# Patient Record
Sex: FEMALE | Race: OTHER | HISPANIC OR LATINO | URBAN - METROPOLITAN AREA
[De-identification: names, ages, dates, MRNs, and addresses within clinical notes are randomized per-mention and may not be internally consistent; named-entity substitution may affect disease eponyms.]

---

## 2017-09-14 ENCOUNTER — EMERGENCY (EMERGENCY)
Facility: HOSPITAL | Age: 82
LOS: 1 days | Discharge: HOME | End: 2017-09-14
Attending: EMERGENCY MEDICINE | Admitting: EMERGENCY MEDICINE
Payer: MEDICARE

## 2017-09-14 VITALS
RESPIRATION RATE: 18 BRPM | HEART RATE: 98 BPM | TEMPERATURE: 100 F | OXYGEN SATURATION: 97 % | SYSTOLIC BLOOD PRESSURE: 199 MMHG | DIASTOLIC BLOOD PRESSURE: 95 MMHG

## 2017-09-14 VITALS
TEMPERATURE: 99 F | DIASTOLIC BLOOD PRESSURE: 72 MMHG | RESPIRATION RATE: 18 BRPM | SYSTOLIC BLOOD PRESSURE: 118 MMHG | HEART RATE: 80 BPM | OXYGEN SATURATION: 97 %

## 2017-09-14 DIAGNOSIS — K94.23 GASTROSTOMY MALFUNCTION: ICD-10-CM

## 2017-09-14 DIAGNOSIS — Z88.0 ALLERGY STATUS TO PENICILLIN: ICD-10-CM

## 2017-09-14 DIAGNOSIS — I10 ESSENTIAL (PRIMARY) HYPERTENSION: ICD-10-CM

## 2017-09-14 PROCEDURE — 74000: CPT

## 2017-09-14 PROCEDURE — 74000: CPT | Mod: 26

## 2017-09-14 PROCEDURE — 43760: CPT

## 2017-09-14 PROCEDURE — 99283 EMERGENCY DEPT VISIT LOW MDM: CPT | Mod: 25

## 2017-09-14 PROCEDURE — 43760 CHANGE GASTROSTOMY TUBE PERCUTANEOUS W/O GUIDE: CPT

## 2017-09-14 RX ORDER — DIATRIZOATE MEGLUMINE 180 MG/ML
30 INJECTION, SOLUTION INTRAVESICAL ONCE
Qty: 0 | Refills: 0 | Status: COMPLETED | OUTPATIENT
Start: 2017-09-14 | End: 2017-09-14

## 2017-09-14 RX ADMIN — DIATRIZOATE MEGLUMINE 30 MILLILITER(S): 180 INJECTION, SOLUTION INTRAVESICAL at 21:40

## 2017-09-14 NOTE — ED PROVIDER NOTE - GASTROINTESTINAL, MLM
Abd soft, NT, ND, NABS. No guarding, rebound, or rigidity. No pulsatile abdominal masses. Unable to flush existing PEG. Balloon already deflated. Existing PEG removed

## 2017-09-14 NOTE — ED PROVIDER NOTE - DIAGNOSTIC INTERPRETATION
ER Physician: Maria Eugenia Sunshine DO  ABDOMINAL XRAY INTERPRETATION:  nonspecific gas pattern, no free air noted, no apparent hepatomegaly. PEG in place

## 2017-09-14 NOTE — ED PROVIDER NOTE - MEDICAL DECISION MAKING DETAILS
Pt sent in for PEG replacement. Replace PEG and confirm w/ fistulogram. Anticipate d/c once placed, back to NH

## 2017-09-14 NOTE — ED ADULT NURSE NOTE - PMH
Aphasia    Dementia    GERD (gastroesophageal reflux disease)    HF (heart failure)    HTN (hypertension)

## 2017-09-14 NOTE — ED ADULT NURSE NOTE - OBJECTIVE STATEMENT
80 y/o female with hx of HF, GERD, HTN, Dementia, aphasia was BIBA after peg tube was accidentally removed while in [facility] today. Upon assessment, pt is non verbal, no visible s/s of pain present, abdomen soft, lung fields WNL, breathing is equal and unlabored, pulses palpable, open wound noted to abdomen with no active bleeding, skin warm to touch. Care in progress. Awaiting disposition 82 y/o female with hx of HF, GERD, HTN, Dementia, aphasia was BIBA after peg tube was accidentally removed while in [facility] today. Upon assessment, pt is non verbal, no visible s/s of pain present, abdomen soft, lung fields WNL, breathing is equal and unlabored, pulses palpable, open wound noted to abdomen with peg tube present, skin warm to touch. Care in progress. Awaiting disposition

## 2017-09-14 NOTE — ED PROVIDER NOTE - OBJECTIVE STATEMENT
Pt w/ PMHx OP, aphasia, GERD, CHF, OA, HTN, CVA w/ hemiparesis, dementia    DNR / DNI Pt w/ PMHx OP, aphasia, GERD, CHF, OA, HTN, CVA w/ hemiparesis, dementia sent into the ED for PEG replacement. Hx obtained from NH documentation. Baseline mental status prevents pt from being able to contribute to hx

## 2019-05-24 ENCOUNTER — EMERGENCY (EMERGENCY)
Facility: HOSPITAL | Age: 84
LOS: 1 days | Discharge: HOME | End: 2019-05-24
Attending: EMERGENCY MEDICINE | Admitting: EMERGENCY MEDICINE
Payer: MEDICAID

## 2019-05-24 VITALS
OXYGEN SATURATION: 97 % | RESPIRATION RATE: 16 BRPM | DIASTOLIC BLOOD PRESSURE: 76 MMHG | TEMPERATURE: 98 F | SYSTOLIC BLOOD PRESSURE: 120 MMHG | HEART RATE: 80 BPM

## 2019-05-24 VITALS
HEART RATE: 73 BPM | RESPIRATION RATE: 16 BRPM | OXYGEN SATURATION: 98 % | TEMPERATURE: 98 F | DIASTOLIC BLOOD PRESSURE: 84 MMHG | SYSTOLIC BLOOD PRESSURE: 139 MMHG

## 2019-05-24 DIAGNOSIS — K94.20 GASTROSTOMY COMPLICATION, UNSPECIFIED: ICD-10-CM

## 2019-05-24 DIAGNOSIS — Z88.0 ALLERGY STATUS TO PENICILLIN: ICD-10-CM

## 2019-05-24 PROCEDURE — 74019 RADEX ABDOMEN 2 VIEWS: CPT

## 2019-05-24 PROCEDURE — 74019 RADEX ABDOMEN 2 VIEWS: CPT | Mod: 26

## 2019-05-24 PROCEDURE — 43762 RPLC GTUBE NO REVJ TRC: CPT

## 2019-05-24 PROCEDURE — 99283 EMERGENCY DEPT VISIT LOW MDM: CPT | Mod: 25

## 2019-05-24 NOTE — ED ADULT TRIAGE NOTE - OTHER COMPLAINTS
From [facility] rehab. PT responsive to verbal and tactile stimuli. Per EMS "normally shes AOx2 said the nurse" DNR.

## 2019-05-24 NOTE — ED ADULT NURSE NOTE - OBJECTIVE STATEMENT
Pt. BIBA from NH for g-tube replacement. Pt. w/ 12F g tube taped to L abd, g tube site is erythematic with dark brown drainage, covered with gauze and tape. Pt. is nonverbal, unable to assess orientation, opens and tracks eyes in response to verbal stimuli, moans in response to tactile stimuli. Pt. wearing diaper from NH. Pt. BIBA from NH for g-tube replacement. Pt. w/ 12F g tube taped to L abd, g tube site is erythematic with dark brown drainage, covered with gauze and tape. Pt. is nonverbal, unable to assess orientation, opens and tracks eyes in response to verbal stimuli, moans in response to tactile stimuli. Pt. wearing soiled diaper from NH, diaper and linens changed in ED. Skin intact.

## 2019-05-24 NOTE — ED PROVIDER NOTE - OBJECTIVE STATEMENT
Pt w/ PMHx HTN, dementia, CHF, CVA w/ hemiplegia, cognitive / speech impairments, not alert at baseline Pt w/ PMHx HTN, dementia, CHF, CVA w/ hemiplegia, cognitive / speech impairments, not alert at baseline, sent in from [facility] rehab for PEG tube replacement. Pt unable to contribute to hx 2/2 baseline mental status. On pt's abdomen is taped a 12 Indonesian feeding tube.

## 2019-05-24 NOTE — ED ADULT NURSE NOTE - OTHER COMPLAINTS
From [facility] rehab. PT responsive to verbal and tactile stimuli. Per EMS "normally shes AOx2 said the nurse" DNR.

## 2019-05-24 NOTE — ED PROVIDER NOTE - NSFOLLOWUPINSTRUCTIONS_ED_ALL_ED_FT
You were sent to the ED after your PEG was displaced. We do not have PEG tubes as small as your gastrostomy would allow. You had a 12 Burmese Gastrostomy tube. Our smallest is 20 Burmese. A 12 Burmese Kulkarni catheter was placed, and placement confirmed by XR, and flushes well. You are being sent back to the nursing home with the Kulkarni in place, to arrange definitive treatment as an outpatient, after the holiday weekend. Dr Griffin aware of management and agrees with the plan.    PEG Tube Home Guide  Image   A percutaneous endoscopic gastrostomy (PEG) tube is used to deliver food and fluids directly into the stomach. The tube has a clamp, a cap, and two anchors (bolsters). One bolster keeps the tube from coming out of the stomach. The other bolster holds the tube against the abdomen.    You will be taught how to use and adjust your PEG tube before you leave the hospital. You will also be taught how to care for the opening (stoma) in your abdomen. Make sure that you understand:  How to care for your PEG tube.  How to care for your stoma.  How to give yourself feedings and medicines.  When to call your health care provider for help.  Supplies needed:  Soapy water  Clean, plain water  Clean washcloth  Gauze pad  Syringe  How to care for a PEG tube  Check your PEG tube every day. Make sure:  It is not too tight. The bolster should rest gently over the stoma.  It is in the correct position. There is a judy on the tube that shows when it is in the correct position. Adjust the tube if you need to.  Cleaning your stoma     ImageClean your stoma every day. Follow these steps:  Wash your hands with soap and water. If soap and water are not available, use hand .    Check the skin around the stoma for redness, rash, swelling, drainage, or extra tissue growth. If you notice any of these, call your health care provider.    Wash the stoma and the skin around it using a clean, soft washcloth. Clean using a circular motion, and wipe away from the stoma opening, not toward it.  Use warm, soapy water, and only use cleansers recommended by your health care provider.  Rinse the stoma area with plain water.  Pat the stoma area dry.  You may place a gauze pad over the opening between the outer bolster and your stoma.    Giving a feeding     Your health care provider will give you instructions about:  How much nutrition and fluid you will need for each feeding.  How often to have a feeding.  Whether to take medicine in the tube by itself or with a feeding.  To give yourself a feeding, follow these steps:  Lay out all of the equipment that you will need.    Make sure that the nutritional formula is at room temperature.    Wash your hands with soap and water.    Position yourself so that you are upright. You will need to stay upright throughout the feeding and for at least 30 minutes after the feeding.    Make sure the syringe plunger is pushed in. Place the tip of the syringe in clean water, and slowly pull the plunger to bring (draw up) the water into the syringe.    Remove the clamp and the cap from the PEG tube.    Push the water out of the syringe to clean (flush) the tube.    If the tube is clear, draw up the formula into the syringe. Make sure to use the right amount for each feeding and add water if necessary.    Slowly push the formula from the syringe through the tube.    After the feeding, flush the tube with water.    Put the clamp and the cap on the tube.    Giving medicine    To give yourself medicine, follow these steps:  Lay out all of the equipment that you will need.    If your medicine is in tablet form, crush the tablet and dissolve it in water.    Wash your hands with soap and water.    Position yourself so that you are upright. You will need to stay upright while you give yourself medicine and for at least 30 minutes afterward.    Make sure the syringe plunger is pushed in. Place the tip of the syringe in clean water, and slowly pull the plunger to bring (draw up) the water into the syringe.    Remove the clamp and the cap from the PEG tube.    Push the water out of the syringe to clean (flush) the tube.    If the tube is clear, draw up the medicine into the syringe.    Slowly push the medicine from the syringe through the tube.    Flush the tube with water.    Put the clamp and the cap on the tube.    Do not take sustained release (SR) medicines through your tube. If you are unsure if your medicine is a SR medicine, ask your health care provider or pharmacist.    Contact a health care provider if you have:  Soreness, redness, or irritation around your stoma.  Abdominal pain or bloating during or after your feedings.  Nausea, constipation, or diarrhea that will not go away.  A fever.  Problems with your PEG tube.  Get help right away if:  Your tube is blocked.  Your tube falls out.  You have pain around your stoma.  You are bleeding from your stoma.  Your tube is leaking.  You choke or you have trouble breathing during or after a feeding.  Summary  A percutaneous endoscopic gastrostomy (PEG) tube is used to deliver food and fluids directly into the stomach.  You will be taught how to use and adjust your PEG tube. You will also be taught how to care for the stoma in your abdomen.  Your health care provider will give you instructions on how to give yourself nutritional formula and medicines through your PEG tube.  Contact your health care provider if you have a fever or soreness, redness, or irritation around your stoma.  Get help right away if your tube leaks, is blocked, or falls out. Get help right away if you have pain or bleeding around your stoma.  This information is not intended to replace advice given to you by your health care provider. Make sure you discuss any questions you have with your health care provider.

## 2019-05-24 NOTE — ED PROVIDER NOTE - PHYSICAL EXAMINATION
Constitutional: Awake, nonverbal, and in no apparent distress. chronically ill appearing  ENMT: Airway patent. Normal MM  Eyes: Clear bilaterally  Cardiac: Normal rate, regular rhythm.  Heart sounds S1, S2.  No murmurs, rubs or gallops.  Respiratory: Breaths sounds equal and clear b/l. No increased WOB, tachypnea, hypoxia, or accessory mm use. Pt speaks in full sentences.   Gastrointestinal: Abd soft, NT, ND, NABS. No guarding, rebound, or rigidity. No pulsatile abdominal masses. No organomegaly appreciated. Small gastrostomy w/ granulation tissue, C/D/I. mild skin irritation around the stoma.   Neuro: does not follow commands  Skin: Skin normal color for race, warm, dry and intact.   Psych: unable to assess 2/2 baseline mental status

## 2019-05-24 NOTE — ED PROVIDER NOTE - CLINICAL SUMMARY MEDICAL DECISION MAKING FREE TEXT BOX
PEG tube replacement. Pt has small stoma and small tube previously used. 20 Latvian smallest tube in ED. Attempted to obtain smaller tube from central supply w/o success. Holiday weekend, G-tube not likely to be replaced in hospital. Will place Kulkarni to keep stoma open. Flushes well. Confirmed by XR. Can arrange for definitive tx next week.

## 2019-05-24 NOTE — ED PROVIDER NOTE - DIAGNOSTIC INTERPRETATION
ABDOMINAL XRAY INTERPRETATION:  nonspecific gas pattern, no free air noted, no apparent hepatomegaly. no extravasation of gastrograffin. reviewed w/ radiology resident

## 2019-05-25 PROBLEM — I50.9 HEART FAILURE, UNSPECIFIED: Chronic | Status: ACTIVE | Noted: 2017-09-14

## 2019-05-25 PROBLEM — F03.90 UNSPECIFIED DEMENTIA WITHOUT BEHAVIORAL DISTURBANCE: Chronic | Status: ACTIVE | Noted: 2017-09-14

## 2019-05-25 PROBLEM — R47.01 APHASIA: Chronic | Status: ACTIVE | Noted: 2017-09-14

## 2019-05-25 PROBLEM — I10 ESSENTIAL (PRIMARY) HYPERTENSION: Chronic | Status: ACTIVE | Noted: 2017-09-14

## 2019-05-25 PROBLEM — K21.9 GASTRO-ESOPHAGEAL REFLUX DISEASE WITHOUT ESOPHAGITIS: Chronic | Status: ACTIVE | Noted: 2017-09-14

## 2019-05-25 NOTE — ED PROCEDURE NOTE - PROCEDURE ADDITIONAL DETAILS
Pt had 12 Omani Feeding tube, removed. Very small gastrostomy with some granulation tissue. Smallest G tube in ED 20 Omani. Checked w/ central supply and unable to find smaller tube. 12 Omani Kulkarni catheter placed w/o difficulty. Flushes easily. Confirmed w/ XR

## 2019-05-28 ENCOUNTER — INPATIENT (INPATIENT)
Facility: HOSPITAL | Age: 84
LOS: 2 days | Discharge: NURSING, REHAB OR LONG-TERM CARE | DRG: 393 | End: 2019-05-31
Attending: INTERNAL MEDICINE | Admitting: INTERNAL MEDICINE
Payer: MEDICARE

## 2019-05-28 VITALS
TEMPERATURE: 98 F | SYSTOLIC BLOOD PRESSURE: 113 MMHG | RESPIRATION RATE: 18 BRPM | HEART RATE: 120 BPM | DIASTOLIC BLOOD PRESSURE: 82 MMHG

## 2019-05-28 DIAGNOSIS — D72.829 ELEVATED WHITE BLOOD CELL COUNT, UNSPECIFIED: ICD-10-CM

## 2019-05-28 DIAGNOSIS — I10 ESSENTIAL (PRIMARY) HYPERTENSION: ICD-10-CM

## 2019-05-28 DIAGNOSIS — E87.0 HYPEROSMOLALITY AND HYPERNATREMIA: ICD-10-CM

## 2019-05-28 DIAGNOSIS — R47.01 APHASIA: ICD-10-CM

## 2019-05-28 DIAGNOSIS — F03.90 UNSPECIFIED DEMENTIA WITHOUT BEHAVIORAL DISTURBANCE: ICD-10-CM

## 2019-05-28 DIAGNOSIS — E88.89 OTHER SPECIFIED METABOLIC DISORDERS: ICD-10-CM

## 2019-05-28 DIAGNOSIS — Z43.1 ENCOUNTER FOR ATTENTION TO GASTROSTOMY: ICD-10-CM

## 2019-05-28 DIAGNOSIS — R00.0 TACHYCARDIA, UNSPECIFIED: ICD-10-CM

## 2019-05-28 DIAGNOSIS — A41.9 SEPSIS, UNSPECIFIED ORGANISM: ICD-10-CM

## 2019-05-28 DIAGNOSIS — Z71.89 OTHER SPECIFIED COUNSELING: ICD-10-CM

## 2019-05-28 DIAGNOSIS — Z91.89 OTHER SPECIFIED PERSONAL RISK FACTORS, NOT ELSEWHERE CLASSIFIED: ICD-10-CM

## 2019-05-28 LAB
ALBUMIN SERPL ELPH-MCNC: 3.4 G/DL — SIGNIFICANT CHANGE UP (ref 3.3–5)
ALBUMIN SERPL ELPH-MCNC: 3.4 G/DL — SIGNIFICANT CHANGE UP (ref 3.3–5)
ALP SERPL-CCNC: 172 U/L — HIGH (ref 40–120)
ALP SERPL-CCNC: SIGNIFICANT CHANGE UP U/L (ref 40–120)
ALT FLD-CCNC: 18 U/L — SIGNIFICANT CHANGE UP (ref 10–45)
ALT FLD-CCNC: SIGNIFICANT CHANGE UP U/L (ref 10–45)
ANION GAP SERPL CALC-SCNC: 10 MMOL/L — SIGNIFICANT CHANGE UP (ref 5–17)
ANION GAP SERPL CALC-SCNC: 15 MMOL/L — SIGNIFICANT CHANGE UP (ref 5–17)
APPEARANCE UR: CLEAR — SIGNIFICANT CHANGE UP
APTT BLD: 27.9 SEC — SIGNIFICANT CHANGE UP (ref 27.5–36.3)
AST SERPL-CCNC: 15 U/L — SIGNIFICANT CHANGE UP (ref 10–40)
AST SERPL-CCNC: SIGNIFICANT CHANGE UP U/L (ref 10–40)
BASOPHILS # BLD AUTO: 0.02 K/UL — SIGNIFICANT CHANGE UP (ref 0–0.2)
BASOPHILS NFR BLD AUTO: 0.1 % — SIGNIFICANT CHANGE UP (ref 0–2)
BILIRUB SERPL-MCNC: 0.7 MG/DL — SIGNIFICANT CHANGE UP (ref 0.2–1.2)
BILIRUB SERPL-MCNC: 0.9 MG/DL — SIGNIFICANT CHANGE UP (ref 0.2–1.2)
BILIRUB UR-MCNC: NEGATIVE — SIGNIFICANT CHANGE UP
BUN SERPL-MCNC: 47 MG/DL — HIGH (ref 7–23)
BUN SERPL-MCNC: 51 MG/DL — HIGH (ref 7–23)
CALCIUM SERPL-MCNC: 11.9 MG/DL — HIGH (ref 8.4–10.5)
CALCIUM SERPL-MCNC: 12.7 MG/DL — HIGH (ref 8.4–10.5)
CHLORIDE SERPL-SCNC: 101 MMOL/L — SIGNIFICANT CHANGE UP (ref 96–108)
CHLORIDE SERPL-SCNC: 105 MMOL/L — SIGNIFICANT CHANGE UP (ref 96–108)
CO2 SERPL-SCNC: 30 MMOL/L — SIGNIFICANT CHANGE UP (ref 22–31)
CO2 SERPL-SCNC: 34 MMOL/L — HIGH (ref 22–31)
COLOR SPEC: YELLOW — SIGNIFICANT CHANGE UP
CREAT SERPL-MCNC: 0.8 MG/DL — SIGNIFICANT CHANGE UP (ref 0.5–1.3)
CREAT SERPL-MCNC: 0.91 MG/DL — SIGNIFICANT CHANGE UP (ref 0.5–1.3)
DIFF PNL FLD: ABNORMAL
EOSINOPHIL # BLD AUTO: 0.02 K/UL — SIGNIFICANT CHANGE UP (ref 0–0.5)
EOSINOPHIL NFR BLD AUTO: 0.1 % — SIGNIFICANT CHANGE UP (ref 0–6)
EXTRA SST TUBE: SIGNIFICANT CHANGE UP
GLUCOSE SERPL-MCNC: 116 MG/DL — HIGH (ref 70–99)
GLUCOSE SERPL-MCNC: 125 MG/DL — HIGH (ref 70–99)
GLUCOSE UR QL: NEGATIVE — SIGNIFICANT CHANGE UP
HCT VFR BLD CALC: 45.6 % — HIGH (ref 34.5–45)
HGB BLD-MCNC: 13.1 G/DL — SIGNIFICANT CHANGE UP (ref 11.5–15.5)
IMM GRANULOCYTES NFR BLD AUTO: 0.4 % — SIGNIFICANT CHANGE UP (ref 0–1.5)
INR BLD: 1.25 — HIGH (ref 0.88–1.16)
KETONES UR-MCNC: NEGATIVE — SIGNIFICANT CHANGE UP
LACTATE SERPL-SCNC: 1.7 MMOL/L — SIGNIFICANT CHANGE UP (ref 0.5–2)
LEUKOCYTE ESTERASE UR-ACNC: NEGATIVE — SIGNIFICANT CHANGE UP
LYMPHOCYTES # BLD AUTO: 13.8 % — SIGNIFICANT CHANGE UP (ref 13–44)
LYMPHOCYTES # BLD AUTO: 2 K/UL — SIGNIFICANT CHANGE UP (ref 1–3.3)
MCHC RBC-ENTMCNC: 21.2 PG — LOW (ref 27–34)
MCHC RBC-ENTMCNC: 28.7 GM/DL — LOW (ref 32–36)
MCV RBC AUTO: 73.8 FL — LOW (ref 80–100)
MONOCYTES # BLD AUTO: 0.83 K/UL — SIGNIFICANT CHANGE UP (ref 0–0.9)
MONOCYTES NFR BLD AUTO: 5.7 % — SIGNIFICANT CHANGE UP (ref 2–14)
NEUTROPHILS # BLD AUTO: 11.54 K/UL — HIGH (ref 1.8–7.4)
NEUTROPHILS NFR BLD AUTO: 79.9 % — HIGH (ref 43–77)
NITRITE UR-MCNC: NEGATIVE — SIGNIFICANT CHANGE UP
NRBC # BLD: 0 /100 WBCS — SIGNIFICANT CHANGE UP (ref 0–0)
PH UR: 6 — SIGNIFICANT CHANGE UP (ref 5–8)
PLATELET # BLD AUTO: 389 K/UL — SIGNIFICANT CHANGE UP (ref 150–400)
POTASSIUM SERPL-MCNC: 3.5 MMOL/L — SIGNIFICANT CHANGE UP (ref 3.5–5.3)
POTASSIUM SERPL-MCNC: SIGNIFICANT CHANGE UP MMOL/L (ref 3.5–5.3)
POTASSIUM SERPL-SCNC: 3.5 MMOL/L — SIGNIFICANT CHANGE UP (ref 3.5–5.3)
POTASSIUM SERPL-SCNC: SIGNIFICANT CHANGE UP MMOL/L (ref 3.5–5.3)
PROT SERPL-MCNC: 8.4 G/DL — HIGH (ref 6–8.3)
PROT SERPL-MCNC: 9.6 G/DL — HIGH (ref 6–8.3)
PROT UR-MCNC: 30 MG/DL
PROTHROM AB SERPL-ACNC: 14.2 SEC — HIGH (ref 10–12.9)
RBC # BLD: 6.18 M/UL — HIGH (ref 3.8–5.2)
RBC # FLD: 19.9 % — HIGH (ref 10.3–14.5)
SODIUM SERPL-SCNC: 146 MMOL/L — HIGH (ref 135–145)
SODIUM SERPL-SCNC: 149 MMOL/L — HIGH (ref 135–145)
SP GR SPEC: 1.02 — SIGNIFICANT CHANGE UP (ref 1–1.03)
UROBILINOGEN FLD QL: 0.2 E.U./DL — SIGNIFICANT CHANGE UP
WBC # BLD: 14.47 K/UL — HIGH (ref 3.8–10.5)
WBC # FLD AUTO: 14.47 K/UL — HIGH (ref 3.8–10.5)

## 2019-05-28 PROCEDURE — 74177 CT ABD & PELVIS W/CONTRAST: CPT | Mod: 26

## 2019-05-28 PROCEDURE — 71045 X-RAY EXAM CHEST 1 VIEW: CPT | Mod: 26

## 2019-05-28 PROCEDURE — 93010 ELECTROCARDIOGRAM REPORT: CPT

## 2019-05-28 PROCEDURE — 99285 EMERGENCY DEPT VISIT HI MDM: CPT | Mod: 25

## 2019-05-28 RX ORDER — ACETAMINOPHEN 500 MG
1000 TABLET ORAL ONCE
Refills: 0 | Status: DISCONTINUED | OUTPATIENT
Start: 2019-05-28 | End: 2019-05-30

## 2019-05-28 RX ORDER — BACLOFEN 100 %
1 POWDER (GRAM) MISCELLANEOUS
Qty: 0 | Refills: 0 | DISCHARGE

## 2019-05-28 RX ORDER — ASPIRIN/CALCIUM CARB/MAGNESIUM 324 MG
1 TABLET ORAL
Qty: 0 | Refills: 0 | DISCHARGE

## 2019-05-28 RX ORDER — AMPICILLIN SODIUM AND SULBACTAM SODIUM 250; 125 MG/ML; MG/ML
1.5 INJECTION, POWDER, FOR SUSPENSION INTRAMUSCULAR; INTRAVENOUS EVERY 6 HOURS
Refills: 0 | Status: DISCONTINUED | OUTPATIENT
Start: 2019-05-28 | End: 2019-05-28

## 2019-05-28 RX ORDER — LISINOPRIL 2.5 MG/1
1 TABLET ORAL
Qty: 0 | Refills: 0 | DISCHARGE

## 2019-05-28 RX ORDER — METRONIDAZOLE 500 MG
500 TABLET ORAL EVERY 8 HOURS
Refills: 0 | Status: DISCONTINUED | OUTPATIENT
Start: 2019-05-28 | End: 2019-05-31

## 2019-05-28 RX ORDER — HEPARIN SODIUM 5000 [USP'U]/ML
5000 INJECTION INTRAVENOUS; SUBCUTANEOUS EVERY 8 HOURS
Refills: 0 | Status: DISCONTINUED | OUTPATIENT
Start: 2019-05-28 | End: 2019-05-31

## 2019-05-28 RX ORDER — SODIUM CHLORIDE 9 MG/ML
1000 INJECTION INTRAMUSCULAR; INTRAVENOUS; SUBCUTANEOUS ONCE
Refills: 0 | Status: COMPLETED | OUTPATIENT
Start: 2019-05-28 | End: 2019-05-28

## 2019-05-28 RX ORDER — SODIUM CHLORIDE 9 MG/ML
1000 INJECTION, SOLUTION INTRAVENOUS
Refills: 0 | Status: DISCONTINUED | OUTPATIENT
Start: 2019-05-28 | End: 2019-05-29

## 2019-05-28 RX ORDER — SODIUM CHLORIDE 9 MG/ML
500 INJECTION INTRAMUSCULAR; INTRAVENOUS; SUBCUTANEOUS ONCE
Refills: 0 | Status: COMPLETED | OUTPATIENT
Start: 2019-05-28 | End: 2019-05-28

## 2019-05-28 RX ADMIN — SODIUM CHLORIDE 1000 MILLILITER(S): 9 INJECTION INTRAMUSCULAR; INTRAVENOUS; SUBCUTANEOUS at 15:50

## 2019-05-28 RX ADMIN — SODIUM CHLORIDE 500 MILLILITER(S): 9 INJECTION INTRAMUSCULAR; INTRAVENOUS; SUBCUTANEOUS at 17:44

## 2019-05-28 RX ADMIN — HEPARIN SODIUM 5000 UNIT(S): 5000 INJECTION INTRAVENOUS; SUBCUTANEOUS at 23:33

## 2019-05-28 RX ADMIN — Medication 100 MILLIGRAM(S): at 23:18

## 2019-05-28 RX ADMIN — SODIUM CHLORIDE 50 MILLILITER(S): 9 INJECTION, SOLUTION INTRAVENOUS at 21:58

## 2019-05-28 RX ADMIN — SODIUM CHLORIDE 1000 MILLILITER(S): 9 INJECTION INTRAMUSCULAR; INTRAVENOUS; SUBCUTANEOUS at 18:47

## 2019-05-28 NOTE — H&P ADULT - PROBLEM SELECTOR PLAN 7
F: 50cc/hour D5W   E: Replete PRN   N: NPO pending peg placement F: 50cc/hour D5W   E: Replete PRN   N: NPO pending peg placement    CODE: DNR/DNI (need MOLST signed in the AM) Discussed this at length with daughter True Hauser who is HCP: [phone] patient non-verbal at baseline. daughter (True Hauser) healthcare proxy and she was called on admission Prior CVA 10 years ago with deficits to swallowing causing her to need PEG tube. Patient non-verbal at baseline. Daughter (True Hauser) healthcare proxy and she was called on admission.

## 2019-05-28 NOTE — ED PROVIDER NOTE - OBJECTIVE STATEMENT
89F pmh HTN, dementia, CHF, CVA w/ hemiplegia, cognitive & speech impairments, not alert or communicative at baseline, sent in from [facility] rehab for PEG tube replacement. 89F pmh HTN, dementia, CHF, CVA w/ hemiplegia, cognitive & speech impairments, not alert or communicative at baseline, [facility] replacement. No add'l hx available from pt given hx mentally impaired.

## 2019-05-28 NOTE — H&P ADULT - PROBLEM SELECTOR PLAN 6
patient non-verbal at baseline. daughter is healthcare proxy and she was called on admission patient non-verbal at baseline. daughter (True Hauser) healthcare proxy and she was called on admission      Patient is DNR/DNI and comfort measures only as per discussion with daughter True Hauser who is the HCP. She reports that the patient has no quality of life and if she were to become hypotensive or having increase work of breathing she does not want escalation/pressor support or her to be intubated. baseline AAOx0 and non-verbal

## 2019-05-28 NOTE — ED PROVIDER NOTE - PROGRESS NOTE DETAILS
Discussed w/ Dr. Griffin, need for GI to replace tube w/ normal PEG (Pt has placeholder lazo at this time), recs consult Dr. Onofre Concern infection vs pain given pt's leukocytosis and tachycardia, however no fever, thus abx held pending definitive imaging. No large fluid bolus for sepsis given pt's hx of CHF. Discussed w/ Dr. Griffin, recs admit, abx for pna, Gi consult for Dr. Valdovinos for peg replacement tomorrow (pt has lazo place keeper) Discussed w/ Dr. Valdovinos, advised lazo in PEG location not passing fluid or suctioning. He agreed w/ plan for likely replacement PEG tomorrow.

## 2019-05-28 NOTE — ED CLERICAL - NS ED CLERK NOTE PRE-ARRIVAL INFORMATION; ADDITIONAL PRE-ARRIVAL INFORMATION
84 Y/O F ROMAIN MCLEAN BEING SENT IN BY DR JAMES YBARRA FROM [facility] FOR PEG TUBE REPLACEMENT

## 2019-05-28 NOTE — ED PROVIDER NOTE - NOTES
Nurses reported to him pt had increased abd distention, concern tube misplacement/malfunction. Was not aware of tachycardia, agrees w/ plan for add'l labs & imaging for concern alternate dx/abd complaint.

## 2019-05-28 NOTE — H&P ADULT - PROBLEM SELECTOR PLAN 9
Dr. Griffin   [phone] F: 50cc/hour D5W   E: Replete PRN   N: NPO pending peg placement    CODE: DNR/DNI (MOLST signed in the chart from prior) Discussed this at length with daughter True Hauser who is HCP: [phone]

## 2019-05-28 NOTE — ED ADULT NURSE NOTE - NSIMPLEMENTINTERV_GEN_ALL_ED
Implemented All Fall Risk Interventions:  [place] to call system. Call bell, personal items and telephone within reach. Instruct patient to call for assistance. Room bathroom lighting operational. Non-slip footwear when patient is off stretcher. Physically safe environment: no spills, clutter or unnecessary equipment. Stretcher in lowest position, wheels locked, appropriate side rails in place. Provide visual cue, wrist band, yellow gown, etc. Monitor gait and stability. Monitor for mental status changes and reorient to person, place, and time. Review medications for side effects contributing to fall risk. Reinforce activity limits and safety measures with patient and family.

## 2019-05-28 NOTE — H&P ADULT - NSHPLABSRESULTS_GEN_ALL_CORE
LABS:                        13.1   14.47 )-----------( 389      ( 28 May 2019 14:34 )             45.6         146<H>  |  101  |  51<H>  ----------------------------<  125<H>  See Note   |  30  |  0.80    Ca    12.7<H>      28 May 2019 14:34    TPro  9.6<H>  /  Alb  3.4  /  TBili  0.9  /  DBili  x   /  AST  See Note  /  ALT  See Note  /  AlkPhos  See Note      PT/INR - ( 28 May 2019 14:34 )   PT: 14.2 sec;   INR: 1.25          PTT - ( 28 May 2019 14:34 )  PTT:27.9 sec  Urinalysis Basic - ( 28 May 2019 16:55 )    Color: Yellow / Appearance: Clear / S.025 / pH: x  Gluc: x / Ketone: NEGATIVE  / Bili: Negative / Urobili: 0.2 E.U./dL   Blood: x / Protein: 30 mg/dL / Nitrite: NEGATIVE   Leuk Esterase: NEGATIVE / RBC: > 10 /HPF / WBC < 5 /HPF   Sq Epi: x / Non Sq Epi: 0-5 /HPF / Bacteria: Present /HPF      RADIOLOGY & ADDITIONAL TESTS: LABS:                        13.1   14.47 )-----------( 389      ( 28 May 2019 14:34 )             45.6         146<H>  |  101  |  51<H>  ----------------------------<  125<H>  See Note   |  30  |  0.80    Ca    12.7<H>      28 May 2019 14:34    TPro  9.6<H>  /  Alb  3.4  /  TBili  0.9  /  DBili  x   /  AST  See Note  /  ALT  See Note  /  AlkPhos  See Note      PT/INR - ( 28 May 2019 14:34 )   PT: 14.2 sec;   INR: 1.25          PTT - ( 28 May 2019 14:34 )  PTT:27.9 sec  Urinalysis Basic - ( 28 May 2019 16:55 )    Color: Yellow / Appearance: Clear / S.025 / pH: x  Gluc: x / Ketone: NEGATIVE  / Bili: Negative / Urobili: 0.2 E.U./dL   Blood: x / Protein: 30 mg/dL / Nitrite: NEGATIVE   Leuk Esterase: NEGATIVE / RBC: > 10 /HPF / WBC < 5 /HPF   Sq Epi: x / Non Sq Epi: 0-5 /HPF / Bacteria: Present /HPF      RADIOLOGY & ADDITIONAL TESTS:    INTERPRETATION: CT SCAN OF ABDOMEN AND PELVIS     History: Abdominal pain and distention. Possible PEG displaced.     Technique: CT scan of abdomen and pelvis was performed from lung bases   through symphysis pubis. Intravenous and oral contrast material were   utilized. Axial, sagittal and coronal reformatted images were reviewed.     Comparison: Chest CT from 2012.     Findings:     Lower chest: There is a large area of consolidation with air bronchograms in   the right lower lobe. Smaller consolidation or atelectasis left lower lobe.     Liver: No change in three subcentimeter low-density lesions, probably   benign. Mild prominence of intrahepatic bile ducts. Common bile duct upper   limits normal caliber, measuring 0.8 cm.     Gallbladder: No radiopaque stones gallbladder.     Spleen: Normal.     Pancreas: Normal.     Adrenal glands: Right adrenal gland unremarkable. No change nodularity of   left adrenal gland.     Kidneys: Bilateral renal cysts, largest lower pole right kidney measuring   4.0 cm. Staghorn calculus fills mid to lower pole collecting system of right   kidney. No hydronephrosis.     Adenopathy: No lymphadenopathy in abdomen or pelvis.     Ascites: None.     Gastrointestinal tract: A percutaneous gastrostomy tube has its distal   portion to four distally, with the balloon present within the jejunum. The   stomach and proximal duodenum are fluid-filled and mildly dilated. There is   nodularity of the pylorus. More distal bowel is unremarkable.     Vessels: A TrapEase inferior vena cava filter has its struts projecting   beyond the lumen of the inferior vena cava and has an oblique orientation.   Moderate calcified plaque aorta and pelvic arteries. Pelvic arteries   tortuous.     Pelvic organs: Status post hysterectomy. No adnexal masses. There are a few   small stones within the bladder.     Soft tissues: Small fat-containing left inguinal hernia.     Bones: Degenerative changes of the spine. Chronic dislocation/destructive   changes left hip.     Impression: 1. The distal portion of a percutaneous gastrostomy tube is   located within the jejunum. It should be repositioned.     2. Nodularity of the pylorus. Recommend endoscopic evaluation.     3. Obliquely oriented IVC filter with the struts projecting beyond the lumen   of the IVC.     4. Large area of consolidation right lower lobe. This could represent   aspiration pneumonia, infectious pneumonia, or atelectasis.

## 2019-05-28 NOTE — H&P ADULT - PROBLEM SELECTOR PLAN 2
unclear etiology at this time but appears to be dehydrated as hgb is also elevated and urine very concentrated with high specific gravity. UA negative for infection.  s/p levaquin and 1L NS in the ED   - f/u AM CXR  - holding off on continuing levaquin for now unclear etiology at this time but appears to be dehydrated as hgb is also elevated and urine very concentrated with high specific gravity. UA negative for infection.  s/p levaquin and 1L NS in the ED. Will continue with treatment with unasyn for aspiration pna and potential cellulitis around the PEG tube   - f/u AM CXR  - c/w unasyn dosed q6 hours Patient is DNR/DNI and comfort measures only as per discussion with daughter True Hauser who is the HCP. She reports that the patient has no quality of life and if she were to become hypotensive or having increase work of breathing she does not want escalation/pressor support or her to be intubated.

## 2019-05-28 NOTE — ED PROVIDER NOTE - PHYSICAL EXAMINATION
VITAL SIGNS: I have reviewed nursing notes and confirm.  CONSTITUTIONAL: Well-developed; well-nourished; minimal distress. Alert, but unresponsive except to noxious stimuli  SKIN: Skin is warm and dry, no acute rash.  HEAD: Normocephalic; atraumatic.  EYES:  EOM intact; conjunctiva and sclera clear.  ENT: No nasal discharge; airway clear. Poor dentition.   NECK: Supple; Voluntary FROM  CARD: No rubs appreciated, tachycardic rate and rhythm.  RESP: No wheezes, no rales. No respiratory distress  ABD: Soft; mild distention, +LUQ PEG tube in place, mild diffuse ttp, no rebound or guarding  EXT: Normal ROM. No cyanosis or edema.  NEURO: Alert, Moving all extremities, +response to noxious stimuli, no verbalizations, not following commands.   PSYCH: unable to assess 2/2 underlying condition.

## 2019-05-28 NOTE — H&P ADULT - PROBLEM SELECTOR PROBLEM 9
Transition of care performed with sharing of clinical summary Nutritional and metabolic disorders in diseases classified elsewhere

## 2019-05-28 NOTE — H&P ADULT - PROBLEM SELECTOR PLAN 4
- holding losartan 10mg daily in the setting of possible infection   - pressures currently stable sinus tachycardia in setting of severe dehydration vs infection   - will c/w Levaquin and flagyl to cover asp pna as patient with an allergy to penicillin and unclear what that allergy is   - c/w maintenance fluids overnight

## 2019-05-28 NOTE — ED ADULT NURSE NOTE - OBJECTIVE STATEMENT
Pt sent in by [facility] for replacement of PEG tube. Dressing present overtop of PEG tube at this time, redness is present around the borders of the dressing. Pt nonverbal at baseline, only responsive to pain.

## 2019-05-28 NOTE — H&P ADULT - PROBLEM SELECTOR PLAN 5
baseline AAOx0 and non-verbal - holding losartan 10mg daily in the setting of possible infection   - pressures currently stable

## 2019-05-28 NOTE — H&P ADULT - PROBLEM SELECTOR PLAN 1
patient presented on May 25 for PEG replacement but due to the holiday she was placed with a temporary lazo and sent home  - PEG replacement in AM patient presented on May 25 for PEG replacement but due to the holiday she was placed with a temporary lazo and sent back to the rehab.   - PEG replacement in AM  - f/u with Dr. Valdovinos (he is aware of patient)

## 2019-05-28 NOTE — H&P ADULT - PROBLEM SELECTOR PROBLEM 8
Transition of care performed with sharing of clinical summary Nutritional and metabolic disorders in diseases classified elsewhere Hypernatremia

## 2019-05-28 NOTE — ED PROVIDER NOTE - DIAGNOSTIC INTERPRETATION
Chest x-ray interpreted by ER Physician Dr. Horan  Findings: heart size enlarged, concern R lower lobe PNA, lungs fully expanded, bony structures intact.

## 2019-05-28 NOTE — ED ADULT NURSE NOTE - CHIEF COMPLAINT QUOTE
pt from [facility] for PEG Tube Replacement. Pt is nonverbal at baseline. Hx of CHF on 2L NC.

## 2019-05-28 NOTE — ED PROVIDER NOTE - CLINICAL SUMMARY MEDICAL DECISION MAKING FREE TEXT BOX
89F pmh HTN, dementia, CHF, CVA w/ hemiplegia, cognitive & speech impairments, not alert or communicative at baseline, sent in from [facility] for PEG tube replacement. No add'l hx available from pt given hx mentally impaired. Exam noted for PEG in place, mild abd distention, mild tenderness nonfocal, +tachycardia. Dr. Griffin agrees, not pts baseline vitals. Agree w/ plan for CT a/p for eval possible PEG malfunction vs acute abdominal pathology. 89F pmh HTN, dementia, CHF, CVA w/ hemiplegia, cognitive & speech impairments, not alert or communicative at baseline, sent in from [facility] for PEG tube replacement. No add'l hx available from pt given hx mentally impaired. Exam noted for PEG in place, mild abd distention, mild tenderness nonfocal, +tachycardia. Dr. Griffin agrees, not pts baseline vitals. Agree w/ plan for CT a/p for eval possible PEG malfunction vs acute abdominal pathology. Concern infection vs pain given pt's leukocytosis and tachycardia, however no fever, thus abx held pending definitive imaging. No large fluid bolus for sepsis given pt's hx of CHF. Discussed w/ Dr. Griffin, recs admit, abx for pna, Gi consult for Dr. Valdovinos for peg replacement tomorrow (pt has lazo place keeper)

## 2019-05-28 NOTE — H&P ADULT - ASSESSMENT
83F with PMH HTN, dementia, CHF, CVA w/ hemiplegia, cognitive & speech impairments, not alert or communicative at baseline, sent in from [facility] rehab for PEG tube replacement.

## 2019-05-28 NOTE — H&P ADULT - PROBLEM SELECTOR PLAN 8
Dr. Griffin   [phone] F: 50cc/hour D5W   E: Replete PRN   N: NPO pending peg placement    CODE: DNR/DNI (need MOLST signed in the AM) Discussed this at length with daughter True Hauser who is HCP: [phone] Sodium at 146 and will c/w 50 cc/hour of D5W overnight; cautious repletion of fluids as unclear what baseline cardiac history is   - continue to trend BMP

## 2019-05-28 NOTE — H&P ADULT - PROBLEM SELECTOR PLAN 3
sinus tachycardia in setting of severe dehydration   - c/w maintenance fluids overnight Patient meeting sepsis criteria with leukocytosis and sinus tachycardia. Lactate normal. CT chest showing possible RLL consolidation suspicious for asp PNA. Pt also severely dehydrated as hgb is also elevated and urine very concentrated with high specific gravity. UA negative for infection.  s/p Levaquin and 1L NS in the ED. Will continue with treatment for aspiration pna and potential cellulitis around the PEG tube. CT chest showing possible RLL consolidation suspicious for asp PNA in which we will continue treatment on flagyl/levaquin renally dosed because patient with penicillin allergy   - f/u AM CXR

## 2019-05-28 NOTE — H&P ADULT - NSICDXPASTMEDICALHX_GEN_ALL_CORE_FT
PAST MEDICAL HISTORY:  Aphasia     Dementia     GERD (gastroesophageal reflux disease)     HF (heart failure)     HTN (hypertension)

## 2019-05-28 NOTE — H&P ADULT - HISTORY OF PRESENT ILLNESS
83F with PMH HTN, dementia, CHF, CVA w/ hemiplegia, cognitive & speech impairments, not alert or communicative at baseline, sent in from [facility] rehab for PEG tube replacement. She had a temporary lazo placed in the site on May 25 and was sent back from rehab to complete the PEG replacement today. Patient non-verbal at baseline and unable to get full ROS.       Vitals in ED s/f: Tmax: 98.8 (rectal) HR 120s, /87, RR 18 and SpO2 100 RA   Labs in ED s/f: leukocytosis but appears hemoconcentrated as patient with high hgb as well. Labs also s/f hypernatremia with sodium of 146. UA very concentrated with high specific gravity but negative for infection. 83F with PMH HTN, dementia, CHF, CVA w/ hemiplegia, cognitive & speech impairments, not alert or communicative at baseline, sent in from [facility] rehab for PEG tube replacement. She had a temporary lazo placed in the site on May 25 and was sent back from rehab to complete the PEG replacement today. Patient non-verbal at baseline and unable to get full ROS.   As per the daughter she has been having extra secretions for the past 2-3 days. She has phlegm and is always congested. PEG placed almost 10 years ago at the same time patient has a stroke. She is also bed bound.      Vitals in ED s/f: Tmax: 98.8 (rectal) HR 120s, /87, RR 18 and SpO2 100 RA   Labs in ED s/f: leukocytosis but appears hemoconcentrated as patient with high hgb as well. Labs also s/f hypernatremia with sodium of 146. UA very concentrated with high specific gravity but negative for infection. 83F with PMH HTN, dementia, CHF, CVA w/ hemiplegia (non-verbal at baseline) who was sent in from [facility] rehab for PEG tube replacement. She had a temporary lazo placed in the site on May 25 in the ED and was sent back from rehab to complete the PEG replacement today. Patient non-verbal at baseline and unable to get full ROS.   As per the daughter she has been having extra secretions for the past 2-3 days. She has phlegm and is always congested as per the daughter. Her PEG was placed almost 10 years ago at the same time patient has a stroke. She is also bed bound.  Patient has pulled out her PEG tube multiple times in the past.     Vitals in ED s/f: Tmax: 98.8 (rectal) HR 120s, /87, RR 18 and SpO2 100 RA   Labs in ED s/f: leukocytosis but appears hemoconcentrated as patient with high hgb as well. Labs also s/f hypernatremia with sodium of 146. UA very concentrated with high specific gravity but negative for infection.

## 2019-05-28 NOTE — H&P ADULT - NSHPPHYSICALEXAM_GEN_ALL_CORE
Vital Signs Last 12 Hrs  T(F): 98.8 (05-28-19 @ 18:05), Max: 98.8 (05-28-19 @ 18:05)  HR: 121 (05-28-19 @ 18:29) (120 - 125)  BP: 125/87 (05-28-19 @ 18:29) (113/82 - 128/85)  RR: 18 (05-28-19 @ 18:29) (18 - 18)  SpO2: 100% (05-28-19 @ 18:29) (98% - 100%)  I&O's Summary      PHYSICAL EXAM:  Constitutional: NAD, comfortable in bed and non-verbal.   HEENT: NC/AT, PERRLA, EOMI, no conjunctival pallor or scleral icterus, MMM  Neck: Supple, no JVD  Respiratory: Normal rate, rhythm, depth, effort. CTAB. No w/r/r.   Cardiovascular: RRR, normal S1 and S2, no m/r/g.   Gastrointestinal: +BS, soft NTND, no guarding or rebound tenderness, no palpable masses   Extremities: wwp; no cyanosis, clubbing or edema.   Vascular: Pulses equal and strong throughout.   Neurological: AAOx0, unable to do full neuro exam   Skin: No gross skin abnormalities or rashes Vital Signs Last 12 Hrs  T(F): 98.8 (05-28-19 @ 18:05), Max: 98.8 (05-28-19 @ 18:05)  HR: 121 (05-28-19 @ 18:29) (120 - 125)  BP: 125/87 (05-28-19 @ 18:29) (113/82 - 128/85)  RR: 18 (05-28-19 @ 18:29) (18 - 18)  SpO2: 100% (05-28-19 @ 18:29) (98% - 100%)  I&O's Summary      PHYSICAL EXAM:  Constitutional: NAD, comfortable in bed and non-verbal.   HEENT: NC/AT, PERRLA, EOMI, no conjunctival pallor or scleral icterus, MMM  Neck: Supple, no JVD  Respiratory: Normal rate, rhythm, depth, effort. CTAB. No w/r/r.   Cardiovascular: RRR, normal S1 and S2, no m/r/g.   Gastrointestinal: +BS, soft NTND, no guarding or rebound tenderness, no palpable masses. PEG tube in place with erythema surrounding but no pus or fluctuance  Extremities: wwp; no cyanosis, clubbing or edema.   Vascular: Pulses equal and strong throughout. B/l lower extremities feel cool to touch   Neurological: AAOx0, unable to do full neuro exam as patient not responding to questions   Skin: No gross skin abnormalities or rashes

## 2019-05-29 LAB
ANION GAP SERPL CALC-SCNC: 14 MMOL/L — SIGNIFICANT CHANGE UP (ref 5–17)
BASOPHILS # BLD AUTO: 0.03 K/UL — SIGNIFICANT CHANGE UP (ref 0–0.2)
BASOPHILS NFR BLD AUTO: 0.3 % — SIGNIFICANT CHANGE UP (ref 0–2)
BUN SERPL-MCNC: 41 MG/DL — HIGH (ref 7–23)
CALCIUM SERPL-MCNC: 11.6 MG/DL — HIGH (ref 8.4–10.5)
CHLORIDE SERPL-SCNC: 104 MMOL/L — SIGNIFICANT CHANGE UP (ref 96–108)
CO2 SERPL-SCNC: 27 MMOL/L — SIGNIFICANT CHANGE UP (ref 22–31)
CREAT SERPL-MCNC: 0.84 MG/DL — SIGNIFICANT CHANGE UP (ref 0.5–1.3)
EOSINOPHIL # BLD AUTO: 0.03 K/UL — SIGNIFICANT CHANGE UP (ref 0–0.5)
EOSINOPHIL NFR BLD AUTO: 0.3 % — SIGNIFICANT CHANGE UP (ref 0–6)
GLUCOSE BLDC GLUCOMTR-MCNC: 127 MG/DL — HIGH (ref 70–99)
GLUCOSE SERPL-MCNC: 110 MG/DL — HIGH (ref 70–99)
HCT VFR BLD CALC: 39.7 % — SIGNIFICANT CHANGE UP (ref 34.5–45)
HGB BLD-MCNC: 10.9 G/DL — LOW (ref 11.5–15.5)
IMM GRANULOCYTES NFR BLD AUTO: 0.3 % — SIGNIFICANT CHANGE UP (ref 0–1.5)
LYMPHOCYTES # BLD AUTO: 1.59 K/UL — SIGNIFICANT CHANGE UP (ref 1–3.3)
LYMPHOCYTES # BLD AUTO: 14.2 % — SIGNIFICANT CHANGE UP (ref 13–44)
MAGNESIUM SERPL-MCNC: 2.2 MG/DL — SIGNIFICANT CHANGE UP (ref 1.6–2.6)
MCHC RBC-ENTMCNC: 20.8 PG — LOW (ref 27–34)
MCHC RBC-ENTMCNC: 27.5 GM/DL — LOW (ref 32–36)
MCV RBC AUTO: 75.6 FL — LOW (ref 80–100)
MONOCYTES # BLD AUTO: 0.88 K/UL — SIGNIFICANT CHANGE UP (ref 0–0.9)
MONOCYTES NFR BLD AUTO: 7.9 % — SIGNIFICANT CHANGE UP (ref 2–14)
NEUTROPHILS # BLD AUTO: 8.61 K/UL — HIGH (ref 1.8–7.4)
NEUTROPHILS NFR BLD AUTO: 77 % — SIGNIFICANT CHANGE UP (ref 43–77)
NRBC # BLD: 0 /100 WBCS — SIGNIFICANT CHANGE UP (ref 0–0)
PLATELET # BLD AUTO: 360 K/UL — SIGNIFICANT CHANGE UP (ref 150–400)
POTASSIUM SERPL-MCNC: SIGNIFICANT CHANGE UP MMOL/L (ref 3.5–5.3)
POTASSIUM SERPL-SCNC: SIGNIFICANT CHANGE UP MMOL/L (ref 3.5–5.3)
RBC # BLD: 5.25 M/UL — HIGH (ref 3.8–5.2)
RBC # FLD: 19.5 % — HIGH (ref 10.3–14.5)
SODIUM SERPL-SCNC: 145 MMOL/L — SIGNIFICANT CHANGE UP (ref 135–145)
WBC # BLD: 11.17 K/UL — HIGH (ref 3.8–10.5)
WBC # FLD AUTO: 11.17 K/UL — HIGH (ref 3.8–10.5)

## 2019-05-29 PROCEDURE — 71045 X-RAY EXAM CHEST 1 VIEW: CPT | Mod: 26

## 2019-05-29 RX ORDER — SODIUM CHLORIDE 9 MG/ML
1000 INJECTION, SOLUTION INTRAVENOUS
Refills: 0 | Status: DISCONTINUED | OUTPATIENT
Start: 2019-05-29 | End: 2019-05-29

## 2019-05-29 RX ORDER — LISINOPRIL 2.5 MG/1
10 TABLET ORAL DAILY
Refills: 0 | Status: DISCONTINUED | OUTPATIENT
Start: 2019-05-29 | End: 2019-05-30

## 2019-05-29 RX ORDER — ASPIRIN/CALCIUM CARB/MAGNESIUM 324 MG
81 TABLET ORAL DAILY
Refills: 0 | Status: DISCONTINUED | OUTPATIENT
Start: 2019-05-29 | End: 2019-05-29

## 2019-05-29 RX ORDER — SODIUM CHLORIDE 9 MG/ML
1000 INJECTION, SOLUTION INTRAVENOUS
Refills: 0 | Status: DISCONTINUED | OUTPATIENT
Start: 2019-05-29 | End: 2019-05-30

## 2019-05-29 RX ORDER — ASPIRIN/CALCIUM CARB/MAGNESIUM 324 MG
81 TABLET ORAL DAILY
Refills: 0 | Status: DISCONTINUED | OUTPATIENT
Start: 2019-05-29 | End: 2019-05-31

## 2019-05-29 RX ADMIN — LISINOPRIL 10 MILLIGRAM(S): 2.5 TABLET ORAL at 17:20

## 2019-05-29 RX ADMIN — Medication 81 MILLIGRAM(S): at 17:19

## 2019-05-29 RX ADMIN — HEPARIN SODIUM 5000 UNIT(S): 5000 INJECTION INTRAVENOUS; SUBCUTANEOUS at 05:41

## 2019-05-29 RX ADMIN — HEPARIN SODIUM 5000 UNIT(S): 5000 INJECTION INTRAVENOUS; SUBCUTANEOUS at 13:20

## 2019-05-29 RX ADMIN — Medication 100 MILLIGRAM(S): at 21:35

## 2019-05-29 RX ADMIN — Medication 100 MILLIGRAM(S): at 05:41

## 2019-05-29 RX ADMIN — Medication 100 MILLIGRAM(S): at 13:21

## 2019-05-29 RX ADMIN — HEPARIN SODIUM 5000 UNIT(S): 5000 INJECTION INTRAVENOUS; SUBCUTANEOUS at 21:35

## 2019-05-29 NOTE — DIETITIAN INITIAL EVALUATION ADULT. - PROBLEM SELECTOR PLAN 1
patient presented on May 25 for PEG replacement but due to the holiday she was placed with a temporary lazo and sent back to the rehab.   - PEG replacement in AM  - f/u with Dr. Valdovinos (he is aware of patient)

## 2019-05-29 NOTE — DIETITIAN INITIAL EVALUATION ADULT. - ENERGY NEEDS
Ideal body weight used for calculations as pt >120% of IBW.   ABW 69.7kg, IBW 54kg, 127% IBW, ht 64" (per sister), BMI 26.5  Nutrient needs based on [facility] standards of care for repletion in older adults, adjusted for CHF/ pressure ulcer guidelines, fluid per team

## 2019-05-29 NOTE — CONSULT NOTE ADULT - SUBJECTIVE AND OBJECTIVE BOX
83F with PMH HTN, dementia, CHF, CVA w/ hemiplegia (non-verbal at baseline) who was sent in from [facility] rehab for PEG tube replacement. She had a temporary lazo placed in the site on May 25 in the ED and was sent back from rehab to complete the PEG replacement . Patient non-verbal at baseline and unable to get full ROS.       REVIEW OF SYSTEMS:  unable    PAST MEDICAL & SURGICAL HISTORY:  Aphasia  Dementia  HTN (hypertension)  HF (heart failure)  GERD (gastroesophageal reflux disease)      FAMILY HISTORY:      SOCIAL HISTORY:  Smoking Status: [ ] Current, [ ] Former, [ ] Never  Pack Years:    MEDICATIONS:  MEDICATIONS  (STANDING):  acetaminophen  IVPB .. 1000 milliGRAM(s) IV Intermittent once  dextrose 5%. 1000 milliLiter(s) (50 mL/Hr) IV Continuous <Continuous>  heparin  Injectable 5000 Unit(s) SubCutaneous every 8 hours  levoFLOXacin IVPB 750 milliGRAM(s) IV Intermittent every 48 hours  metroNIDAZOLE  IVPB 500 milliGRAM(s) IV Intermittent every 8 hours    MEDICATIONS  (PRN):      Allergies    penicillin (Unknown)    Intolerances        Vital Signs Last 24 Hrs  T(C): 37 (29 May 2019 08:58), Max: 37.1 (28 May 2019 18:05)  T(F): 98.6 (29 May 2019 08:58), Max: 98.8 (28 May 2019 18:05)  HR: 110 (29 May 2019 08:58) (83 - 125)  BP: 130/75 (29 May 2019 08:58) (110/65 - 154/80)  BP(mean): --  RR: 18 (29 May 2019 08:58) (18 - 20)  SpO2: 97% (29 May 2019 08:58) (95% - 100%)    05-28 @ 07:01  -  05-29 @ 07:00  --------------------------------------------------------  IN: 550 mL / OUT: 0 mL / NET: 550 mL          PHYSICAL EXAM:    General:  in no acute distress  HEENT: MMM, conjunctiva and sclera clear  Gastrointestinal: Soft, non-tender mildly-distended; Normal bowel sounds; No rebound or guarding  peg site noted, lazo migrated, embedded, likely the balloon migrated to duodenum causing obstruction   Extremities: Normal range of motion, No clubbing, cyanosis or edema  Neurological: Alert and oriented x3  Skin: Warm and dry. No obvious rash      LABS:                        13.1   14.47 )-----------( 389      ( 28 May 2019 14:34 )             45.6     05-29    145  |  104  |  41<H>  ----------------------------<  110<H>  SEE NOTE   |  27  |  0.84    Ca    11.6<H>      29 May 2019 07:29  Mg     2.2     05-29    TPro  8.4<H>  /  Alb  3.4  /  TBili  0.7  /  DBili  x   /  AST  15  /  ALT  18  /  AlkPhos  172<H>  05-28      Culture Results:   No growth at 12 hours (05-28 @ 21:00)  Culture Results:   No growth at 12 hours (05-28 @ 21:00)      RADIOLOGY & ADDITIONAL STUDIES:

## 2019-05-29 NOTE — PROCEDURE NOTE - NSINFORMCONSENT_GEN_A_CORE
Benefits, risks, and possible complications of procedure explained to patient/caregiver who verbalized understanding and gave verbal consent./phone consent child Manju Gaitan [phone]

## 2019-05-29 NOTE — DIETITIAN INITIAL EVALUATION ADULT. - PROBLEM SELECTOR PLAN 2
Patient is DNR/DNI and comfort measures only as per discussion with daughter True Hauser who is the HCP. She reports that the patient has no quality of life and if she were to become hypotensive or having increase work of breathing she does not want escalation/pressor support or her to be intubated.

## 2019-05-29 NOTE — DIETITIAN INITIAL EVALUATION ADULT. - PROBLEM SELECTOR PLAN 4
sinus tachycardia in setting of severe dehydration vs infection   - will c/w Levaquin and flagyl to cover asp pna as patient with an allergy to penicillin and unclear what that allergy is   - c/w maintenance fluids overnight

## 2019-05-29 NOTE — DIETITIAN INITIAL EVALUATION ADULT. - NS AS NUTRI INTERV ENTERAL NUTRITION
Schedule/Composition/Concentration/Rate/Recommend Jevity 1.5 @45ml/hr x24hrs via PEG as feasible. Will provide (1080ml TV, 1620kcal, 68g pro, 820ml h2o), 108% RDI, 1.25g/kg pro/ ABW. Keep HOB >30-45 degrees for feeding, check GRV q4hrs, hold if >500ml, monitor for s/sx of intolerance

## 2019-05-29 NOTE — DIETITIAN INITIAL EVALUATION ADULT. - OTHER INFO
83F with PMH HTN, dementia, CHF, CVA w/ hemiplegia (non-verbal at baseline) who was sent in from [facility] rehab for PEG tube replacement. She had a temporary lazo placed in the site on May 25 in the ED and was sent back from rehab to complete the PEG replacement. Patient non-verbal at baseline, bed-bound, noted to often pull PEG out. Sister at bedside to obtain some hx, unsure of usual EN regimen however noted per paperwork from rehab pt was on Jevity 1.5 @60ml/hr x16 hours (960ml TV, 1440kcal, 61g pro, 730ml h2o). Unclear why pt not on 24hr feeds as she is bedbound/ AOx0, does not ambulate. No noted n/v/c, diarrhea noted per report, skin with stage 1 pressure ulcer to cheek as NC taped in place, does not appear to be in pain. NKFA noted, per sister wt has been stable as far as she knows. Pt now s/p PEG replacement this AM, see recs below if wish to initiate feeds while admitted. Will continue to follow per protocol.

## 2019-05-29 NOTE — DIETITIAN INITIAL EVALUATION ADULT. - PROBLEM SELECTOR PLAN 7
Prior CVA 10 years ago with deficits to swallowing causing her to need PEG tube. Patient non-verbal at baseline. Daughter (True Hauser) healthcare proxy and she was called on admission.

## 2019-05-29 NOTE — CONSULT NOTE ADULT - ASSESSMENT
replacement peg is undersized and balloon migrated to duodenum causing gastric outlet obstruction with large amount of bile oozing out of peg site  i removed it and cleaned the sits and drained most of the bile  Peg change to follow

## 2019-05-29 NOTE — DIETITIAN INITIAL EVALUATION ADULT. - PROBLEM SELECTOR PLAN 8
Sodium at 146 and will c/w 50 cc/hour of D5W overnight; cautious repletion of fluids as unclear what baseline cardiac history is   - continue to trend BMP

## 2019-05-29 NOTE — DIETITIAN INITIAL EVALUATION ADULT. - PROBLEM SELECTOR PLAN 3
Patient meeting sepsis criteria with leukocytosis and sinus tachycardia. Lactate normal. CT chest showing possible RLL consolidation suspicious for asp PNA. Pt also severely dehydrated as hgb is also elevated and urine very concentrated with high specific gravity. UA negative for infection.  s/p Levaquin and 1L NS in the ED. Will continue with treatment for aspiration pna and potential cellulitis around the PEG tube. CT chest showing possible RLL consolidation suspicious for asp PNA in which we will continue treatment on flagyl/levaquin renally dosed because patient with penicillin allergy   - f/u AM CXR

## 2019-05-29 NOTE — DIETITIAN INITIAL EVALUATION ADULT. - PROBLEM SELECTOR PLAN 9
F: 50cc/hour D5W   E: Replete PRN   N: NPO pending peg placement    CODE: DNR/DNI (MOLST signed in the chart from prior) Discussed this at length with daughter True Hauser who is HCP: [phone]

## 2019-05-30 LAB
ANION GAP SERPL CALC-SCNC: 9 MMOL/L — SIGNIFICANT CHANGE UP (ref 5–17)
BUN SERPL-MCNC: 30 MG/DL — HIGH (ref 7–23)
CALCIUM SERPL-MCNC: 10.6 MG/DL — HIGH (ref 8.4–10.5)
CHLORIDE SERPL-SCNC: 105 MMOL/L — SIGNIFICANT CHANGE UP (ref 96–108)
CO2 SERPL-SCNC: 27 MMOL/L — SIGNIFICANT CHANGE UP (ref 22–31)
CREAT SERPL-MCNC: 0.79 MG/DL — SIGNIFICANT CHANGE UP (ref 0.5–1.3)
CULTURE RESULTS: NO GROWTH — SIGNIFICANT CHANGE UP
GLUCOSE BLDC GLUCOMTR-MCNC: 101 MG/DL — HIGH (ref 70–99)
GLUCOSE BLDC GLUCOMTR-MCNC: 104 MG/DL — HIGH (ref 70–99)
GLUCOSE BLDC GLUCOMTR-MCNC: 111 MG/DL — HIGH (ref 70–99)
GLUCOSE SERPL-MCNC: 126 MG/DL — HIGH (ref 70–99)
HCT VFR BLD CALC: 31.4 % — LOW (ref 34.5–45)
HGB BLD-MCNC: 8.5 G/DL — LOW (ref 11.5–15.5)
MAGNESIUM SERPL-MCNC: 2 MG/DL — SIGNIFICANT CHANGE UP (ref 1.6–2.6)
MCHC RBC-ENTMCNC: 20.6 PG — LOW (ref 27–34)
MCHC RBC-ENTMCNC: 27.1 GM/DL — LOW (ref 32–36)
MCV RBC AUTO: 76.2 FL — LOW (ref 80–100)
NRBC # BLD: 0 /100 WBCS — SIGNIFICANT CHANGE UP (ref 0–0)
PHOSPHATE SERPL-MCNC: 1.8 MG/DL — LOW (ref 2.5–4.5)
PLATELET # BLD AUTO: 279 K/UL — SIGNIFICANT CHANGE UP (ref 150–400)
POTASSIUM SERPL-MCNC: 3 MMOL/L — LOW (ref 3.5–5.3)
POTASSIUM SERPL-SCNC: 3 MMOL/L — LOW (ref 3.5–5.3)
RBC # BLD: 4.12 M/UL — SIGNIFICANT CHANGE UP (ref 3.8–5.2)
RBC # FLD: 18.6 % — HIGH (ref 10.3–14.5)
SODIUM SERPL-SCNC: 141 MMOL/L — SIGNIFICANT CHANGE UP (ref 135–145)
SPECIMEN SOURCE: SIGNIFICANT CHANGE UP
WBC # BLD: 6.97 K/UL — SIGNIFICANT CHANGE UP (ref 3.8–10.5)
WBC # FLD AUTO: 6.97 K/UL — SIGNIFICANT CHANGE UP (ref 3.8–10.5)

## 2019-05-30 PROCEDURE — 71045 X-RAY EXAM CHEST 1 VIEW: CPT | Mod: 26

## 2019-05-30 RX ORDER — SODIUM CHLORIDE 9 MG/ML
500 INJECTION INTRAMUSCULAR; INTRAVENOUS; SUBCUTANEOUS ONCE
Refills: 0 | Status: DISCONTINUED | OUTPATIENT
Start: 2019-05-30 | End: 2019-05-31

## 2019-05-30 RX ORDER — POTASSIUM CHLORIDE 20 MEQ
40 PACKET (EA) ORAL EVERY 4 HOURS
Refills: 0 | Status: DISCONTINUED | OUTPATIENT
Start: 2019-05-30 | End: 2019-05-30

## 2019-05-30 RX ORDER — POTASSIUM CHLORIDE 20 MEQ
40 PACKET (EA) ORAL ONCE
Refills: 0 | Status: COMPLETED | OUTPATIENT
Start: 2019-05-30 | End: 2019-05-30

## 2019-05-30 RX ORDER — SODIUM CHLORIDE 9 MG/ML
500 INJECTION INTRAMUSCULAR; INTRAVENOUS; SUBCUTANEOUS ONCE
Refills: 0 | Status: COMPLETED | OUTPATIENT
Start: 2019-05-30 | End: 2019-05-30

## 2019-05-30 RX ORDER — IPRATROPIUM/ALBUTEROL SULFATE 18-103MCG
3 AEROSOL WITH ADAPTER (GRAM) INHALATION EVERY 6 HOURS
Refills: 0 | Status: DISCONTINUED | OUTPATIENT
Start: 2019-05-30 | End: 2019-05-31

## 2019-05-30 RX ORDER — POTASSIUM PHOSPHATE, MONOBASIC POTASSIUM PHOSPHATE, DIBASIC 236; 224 MG/ML; MG/ML
15 INJECTION, SOLUTION INTRAVENOUS ONCE
Refills: 0 | Status: COMPLETED | OUTPATIENT
Start: 2019-05-30 | End: 2019-05-30

## 2019-05-30 RX ORDER — ACETAMINOPHEN 500 MG
650 TABLET ORAL ONCE
Refills: 0 | Status: COMPLETED | OUTPATIENT
Start: 2019-05-30 | End: 2019-05-30

## 2019-05-30 RX ADMIN — Medication 100 MILLIGRAM(S): at 05:49

## 2019-05-30 RX ADMIN — SODIUM CHLORIDE 2000 MILLILITER(S): 9 INJECTION INTRAMUSCULAR; INTRAVENOUS; SUBCUTANEOUS at 17:30

## 2019-05-30 RX ADMIN — HEPARIN SODIUM 5000 UNIT(S): 5000 INJECTION INTRAVENOUS; SUBCUTANEOUS at 05:49

## 2019-05-30 RX ADMIN — Medication 100 MILLIGRAM(S): at 13:38

## 2019-05-30 RX ADMIN — POTASSIUM PHOSPHATE, MONOBASIC POTASSIUM PHOSPHATE, DIBASIC 63.75 MILLIMOLE(S): 236; 224 INJECTION, SOLUTION INTRAVENOUS at 12:47

## 2019-05-30 RX ADMIN — Medication 650 MILLIGRAM(S): at 18:18

## 2019-05-30 RX ADMIN — HEPARIN SODIUM 5000 UNIT(S): 5000 INJECTION INTRAVENOUS; SUBCUTANEOUS at 13:37

## 2019-05-30 RX ADMIN — LISINOPRIL 10 MILLIGRAM(S): 2.5 TABLET ORAL at 05:49

## 2019-05-30 RX ADMIN — Medication 81 MILLIGRAM(S): at 12:47

## 2019-05-30 RX ADMIN — Medication 40 MILLIEQUIVALENT(S): at 12:47

## 2019-05-30 RX ADMIN — HEPARIN SODIUM 5000 UNIT(S): 5000 INJECTION INTRAVENOUS; SUBCUTANEOUS at 22:45

## 2019-05-30 RX ADMIN — Medication 100 MILLIGRAM(S): at 22:45

## 2019-05-30 NOTE — PROGRESS NOTE ADULT - PROBLEM SELECTOR PROBLEM 1
PEG (percutaneous endoscopic gastrostomy) adjustment/replacement/removal

## 2019-05-30 NOTE — CHART NOTE - NSCHARTNOTEFT_GEN_A_CORE
Called by RN in the evening patient was found to be hypotensive to 80/50, HR 80, saturating at 98% on oxygen. Then found to be febrile on rectal temp to 100.7. Given 500cc bolus with minimal improvement in blood pressure. CXR significant for prior consolidation in the right lower lobe. Due to minimal BP improvement with fluid challenge, called daughter and updated on acute events. Per daughter's wishes she would like her mother to be comfortable, she explains that she has suffered enough and does not want escalation of care or no pressors or central access. Her daughter understands hypotension can cause hypoperfusion and potentially death, she says that her mother would have wanted to pass naturally and be comfortable. MEWS exemption order in place. Palliative team reconsulted for guidance.

## 2019-05-30 NOTE — PROGRESS NOTE ADULT - PROBLEM SELECTOR PROBLEM 9
Nutritional and metabolic disorders in diseases classified elsewhere

## 2019-05-30 NOTE — PROGRESS NOTE ADULT - PROBLEM SELECTOR PLAN 9
CODE: DNR/DNI (MOLST signed in the chart from prior) Discussed this at length with daughter True Hauser who is HCP: [phone]
F: 50cc/hour D5W   E: Replete PRN   N: NPO pending peg placement    CODE: DNR/DNI (MOLST signed in the chart from prior) Discussed this at length with daughter True Hauser who is HCP: [phone]
F: 50cc/hour D5W   E: Replete PRN   N: NPO pending peg placement    CODE: DNR/DNI (MOLST signed in the chart from prior) Discussed this at length with daughter True Hauser who is HCP: [phone]

## 2019-05-30 NOTE — PROGRESS NOTE ADULT - PROBLEM SELECTOR PLAN 5
- holding losartan 10mg daily in the setting of possible infection   - pressures currently stable

## 2019-05-30 NOTE — PROGRESS NOTE ADULT - PROBLEM SELECTOR PLAN 4
sinus tachycardia in setting of severe dehydration vs infection   - will c/w Levaquin and flagyl to cover asp pna as patient with an allergy to penicillin and unclear what that allergy is   - c/w maintenance fluids overnight
sinus tachycardia in setting of severe dehydration vs infection   - will c/w Levaquin and flagyl to cover asp pna as patient with an allergy to penicillin and unclear what that allergy is   - c/w maintenance fluids overnight
1. Admit to ICU under Dr. Gamaliel christensen/ hospitalist service for medicine   2. repeat CXR, ABG, make vent changes accordingly
sinus tachycardia in setting of severe dehydration vs infection   - will c/w Levaquin and flagyl to cover asp pna as patient with an allergy to penicillin and unclear what that allergy is   - c/w maintenance fluids overnight

## 2019-05-30 NOTE — PROGRESS NOTE ADULT - PROBLEM SELECTOR PLAN 3
Patient meeting sepsis criteria with leukocytosis and sinus tachycardia. Lactate normal. CT chest showing possible RLL consolidation suspicious for asp PNA. Pt also severely dehydrated as hgb is also elevated and urine very concentrated with high specific gravity. UA negative for infection.  s/p Levaquin and 1L NS in the ED. Will continue with treatment for aspiration pna and potential cellulitis around the PEG tube. CT chest showing possible RLL consolidation suspicious for asp PNA in which we will continue treatment on flagyl/levaquin renally dosed because patient with penicillin allergy   - f/u AM CXR c/w previous

## 2019-05-30 NOTE — PROGRESS NOTE ADULT - PROBLEM SELECTOR PLAN 1
patient presented on May 25 for PEG replacement but due to the holiday she was placed with a temporary lazo and sent back to the rehab.   - PEG replacement in AM  - f/u with Dr. Valdovinos (he is aware of patient)
patient presented on May 25 for PEG replacement but due to the holiday she was placed with a temporary lazo and sent back to the rehab.   - s/p PEG replacement, started feeds per nutrition recs  - f/u with Dr. Valdovinos (he is aware of patient)
patient presented on May 25 for PEG replacement but due to the holiday she was placed with a temporary lazo and sent back to the rehab.   - s/p PEG replacement, started feeds per nutrition recs  - f/u with Dr. Valdovinos (he is aware of patient)

## 2019-05-31 ENCOUNTER — TRANSCRIPTION ENCOUNTER (OUTPATIENT)
Age: 84
End: 2019-05-31

## 2019-05-31 VITALS
DIASTOLIC BLOOD PRESSURE: 55 MMHG | TEMPERATURE: 99 F | HEART RATE: 77 BPM | RESPIRATION RATE: 18 BRPM | SYSTOLIC BLOOD PRESSURE: 103 MMHG | OXYGEN SATURATION: 97 %

## 2019-05-31 PROCEDURE — 74177 CT ABD & PELVIS W/CONTRAST: CPT

## 2019-05-31 PROCEDURE — 96374 THER/PROPH/DIAG INJ IV PUSH: CPT | Mod: XU

## 2019-05-31 PROCEDURE — 71045 X-RAY EXAM CHEST 1 VIEW: CPT

## 2019-05-31 PROCEDURE — 93005 ELECTROCARDIOGRAM TRACING: CPT

## 2019-05-31 PROCEDURE — 80053 COMPREHEN METABOLIC PANEL: CPT

## 2019-05-31 PROCEDURE — 82962 GLUCOSE BLOOD TEST: CPT

## 2019-05-31 PROCEDURE — 87040 BLOOD CULTURE FOR BACTERIA: CPT

## 2019-05-31 PROCEDURE — 96361 HYDRATE IV INFUSION ADD-ON: CPT

## 2019-05-31 PROCEDURE — 85027 COMPLETE CBC AUTOMATED: CPT

## 2019-05-31 PROCEDURE — C1889: CPT

## 2019-05-31 PROCEDURE — 80048 BASIC METABOLIC PNL TOTAL CA: CPT

## 2019-05-31 PROCEDURE — 83735 ASSAY OF MAGNESIUM: CPT

## 2019-05-31 PROCEDURE — 99285 EMERGENCY DEPT VISIT HI MDM: CPT | Mod: 25

## 2019-05-31 PROCEDURE — 85730 THROMBOPLASTIN TIME PARTIAL: CPT

## 2019-05-31 PROCEDURE — 84100 ASSAY OF PHOSPHORUS: CPT

## 2019-05-31 PROCEDURE — 85025 COMPLETE CBC W/AUTO DIFF WBC: CPT

## 2019-05-31 PROCEDURE — 36415 COLL VENOUS BLD VENIPUNCTURE: CPT

## 2019-05-31 PROCEDURE — 87086 URINE CULTURE/COLONY COUNT: CPT

## 2019-05-31 PROCEDURE — 94640 AIRWAY INHALATION TREATMENT: CPT

## 2019-05-31 PROCEDURE — 81001 URINALYSIS AUTO W/SCOPE: CPT

## 2019-05-31 PROCEDURE — 85610 PROTHROMBIN TIME: CPT

## 2019-05-31 PROCEDURE — 83605 ASSAY OF LACTIC ACID: CPT

## 2019-05-31 RX ORDER — METRONIDAZOLE 500 MG
500 TABLET ORAL
Qty: 0 | Refills: 0 | DISCHARGE
Start: 2019-05-31 | End: 2019-06-04

## 2019-05-31 RX ORDER — METRONIDAZOLE 500 MG
100 TABLET ORAL
Qty: 0 | Refills: 0 | DISCHARGE
Start: 2019-05-31 | End: 2019-06-04

## 2019-05-31 RX ADMIN — HEPARIN SODIUM 5000 UNIT(S): 5000 INJECTION INTRAVENOUS; SUBCUTANEOUS at 06:27

## 2019-05-31 RX ADMIN — Medication 3 MILLILITER(S): at 11:20

## 2019-05-31 RX ADMIN — Medication 100 MILLIGRAM(S): at 06:28

## 2019-05-31 RX ADMIN — Medication 3 MILLILITER(S): at 06:27

## 2019-05-31 RX ADMIN — Medication 81 MILLIGRAM(S): at 11:20

## 2019-05-31 RX ADMIN — Medication 3 MILLILITER(S): at 00:29

## 2019-05-31 NOTE — DISCHARGE NOTE PROVIDER - NSDCCPCAREPLAN_GEN_ALL_CORE_FT
PRINCIPAL DISCHARGE DIAGNOSIS  Diagnosis: Abdominal pain, unspecified abdominal location  Assessment and Plan of Treatment: You were admitted with a malfunction in your PEG tube, the feeding tube that goes directly to your stomach. This was evaluated by Dr. Valdovinos who found that the tube had migrated to a different part of the stomach and was causing a blockage. Dr. Valdovinos replaced the PEG tube and we restarted feeds without complication. During your stay you were also found to have signs of infection on your chest x-ray, likely a pneumonia called "aspiration pneumonia" due to food particles and secretions entering the lungs. We discussed your goals of care which were clarified to be comfort measures only with no intubation or resuscitation. At this time we felt it was appropriate to discharge you with a course of antibiotics for the pneumonia with a palliative care referral at your facility.

## 2019-05-31 NOTE — PROGRESS NOTE ADULT - ASSESSMENT
83F with PMH HTN, dementia, CHF, CVA w/ hemiplegia, cognitive & speech impairments, not alert or communicative at baseline, sent in from [facility] rehab for PEG tube replacement. PEG replaced by Dr. Valdovinos on 5/29.
routine use and care of peg
83F with PMH HTN, dementia, CHF, CVA w/ hemiplegia, cognitive & speech impairments, not alert or communicative at baseline, sent in from [facility] rehab for PEG tube replacement. PEG replaced by Dr. Valdovinos on 5/29.
83F with PMH HTN, dementia, CHF, CVA w/ hemiplegia, cognitive & speech impairments, not alert or communicative at baseline, sent in from [facility] rehab for PEG tube replacement. PEG replaced by Dr. Valdovinos on 5/29.

## 2019-05-31 NOTE — PROGRESS NOTE ADULT - SUBJECTIVE AND OBJECTIVE BOX
CC/ HPI Patient is a 83 year old female with hypertension, dementia, congestive heart failure, CVA w/ hemiplegia, cognitive & speech impairments, not alert or communicative at baseline, sent in from [facility] rehab for PEG tube replacement found to have pneumonia, sepsis, seen this morning appears comfortable.        PAST MEDICAL & SURGICAL HISTORY:  Aphasia  Dementia  HTN (hypertension)  HF (heart failure)  GERD (gastroesophageal reflux disease)    SOCHX:  unknown tobacco,  -  alcohol    FMHX: FA/MO  - contributory     ROS reviewed below with positive findings marked (+) :  GEN:  fever, chills ENT: tracheostomy,   epistaxis,  sinusitis COR: CAD, +CHF,  HTN, dysrhythmia PUL: COPD, ILD, asthma, pneumonia GI: PEG, dysphagia, hemorrhage, other MARGARET: kidney disease, electrolyte disorder HEM:  anemia, thrombus, coagulopathy, cancer ENDO:  thyroid disease, diabetes mellitus CNS:  +dementia, stroke, seizure, PSY:  depression, anxiety, other      MEDICATIONS  (STANDING):  ALBUTerol/ipratropium for Nebulization 3 milliLiter(s) Nebulizer every 6 hours  aspirin  chewable 81 milliGRAM(s) Oral daily  heparin  Injectable 5000 Unit(s) SubCutaneous every 8 hours  levoFLOXacin IVPB 750 milliGRAM(s) IV Intermittent every 48 hours  metroNIDAZOLE  IVPB 500 milliGRAM(s) IV Intermittent every 8 hours  sodium chloride 0.9% Bolus 500 milliLiter(s) IV Bolus once    MEDICATIONS  (PRN):      Vital Signs Last 24 Hrs  T(C): 37.4 (31 May 2019 09:19), Max: 38.2 (30 May 2019 18:19)  T(F): 99.3 (31 May 2019 09:19), Max: 100.7 (30 May 2019 18:19)  HR: 77 (31 May 2019 09:19) (77 - 91)  BP: 103/55 (31 May 2019 09:19) (80/50 - 107/67)  BP(mean): --  RR: 18 (31 May 2019 09:19) (18 - 19)  SpO2: 97% (31 May 2019 09:19) (95% - 98%)    GENERAL:         comfortable,  - distress.  HEENT:            - trauma,  - icterus,  - injection,  - nasal discharge.  NECK:              - jugular venous distention, - thyromegaly.  LYMPH:           - lymphadenopathy, - masses.  RESP:              + crackles,   - rales,   - rhonchi,   - wheezes.   COR:                S1S2   - gallops,  - rubs.  ABD:                bowel sounds,   soft, - tender, - distended, - organomegaly.  EXT/MSC:         - cyanosis,  - clubbing,  - edema.    NEURO:             alert,   responds to stimuli.                            8.5    6.97  )-----------( 279      ( 30 May 2019 10:21 )             31.4     05-30    141  |  105  |  30<H>  ----------------------------<  126<H>  3.0<L>   |  27  |  0.79        X-ray Chest  (05.29.19 @ 06:59)   The consolidation in the right lower lobe that is present on the CT scan of 5/28/2019 is not well-visualized on this study. It may represent pneumonia or aspiration.    CT Abdomen and Pelvis w/ IV Cont (05.28.19)  There is a large area of consolidation with air bronchograms in the right lower lobe. Smaller consolidation or atelectasis left lower lobe.         ASSESSMENT/PLAN    1)  Pneumonia  2)  Atelectasis  4)  Dementia  5)  Congestive heart failure    Oxygen as needed for a satisfactory SpO2  Bronchodilators:  Atrovent/ albuterol q 4 – 6 hours as needed  ID/Antibiotics: levofloxacin, metronidazole, follow up cultures  Cardiac/HTN: hemodynamically stable  GI: Rx/ prophylaxis c PPI/H2B  Heme: Rx/VT prophylaxis  HCP Goals of care comfort   Discussed with medical team
CC/ HPI Patient is a 83 year old female with hypertension, dementia, congestive heart failure, CVA w/ hemiplegia, cognitive & speech impairments, not alert or communicative at baseline, sent in from [facility] rehab for PEG tube replacement found to have pneumonia, sepsis, seen this morning appears comfortable.        PAST MEDICAL & SURGICAL HISTORY:  Aphasia  Dementia  HTN (hypertension)  HF (heart failure)  GERD (gastroesophageal reflux disease)    SOCHX:  unknown tobacco,  -  alcohol    FMHX: FA/MO  - contributory     ROS reviewed below with positive findings marked (+) :  GEN:  fever, chills ENT: tracheostomy,   epistaxis,  sinusitis COR: CAD, +CHF,  HTN, dysrhythmia PUL: COPD, ILD, asthma, pneumonia GI: PEG, dysphagia, hemorrhage, other MARGARET: kidney disease, electrolyte disorder HEM:  anemia, thrombus, coagulopathy, cancer ENDO:  thyroid disease, diabetes mellitus CNS:  +dementia, stroke, seizure, PSY:  depression, anxiety, other      MEDICATIONS  (STANDING):  acetaminophen  IVPB .. 1000 milliGRAM(s) IV Intermittent once  dextrose 5%. 1000 milliLiter(s) (50 mL/Hr) IV Continuous <Continuous>  heparin  Injectable 5000 Unit(s) SubCutaneous every 8 hours  levoFLOXacin IVPB 750 milliGRAM(s) IV Intermittent every 48 hours  metroNIDAZOLE  IVPB 500 milliGRAM(s) IV Intermittent every 8 hours      Vital Signs Last 24 Hrs  T(C): 37 (29 May 2019 08:58), Max: 37.1 (28 May 2019 18:05)  T(F): 98.6 (29 May 2019 08:58), Max: 98.8 (28 May 2019 18:05)  HR: 110 (29 May 2019 08:58) (83 - 125)  BP: 130/75 (29 May 2019 08:58) (110/65 - 154/80)  RR: 18 (29 May 2019 08:58) (18 - 20)  SpO2: 97% (29 May 2019 08:58) (95% - 100%)    GENERAL:         comfortable,  - distress.  HEENT:            - trauma,  - icterus,  - injection,  - nasal discharge.  NECK:              - jugular venous distention, - thyromegaly.  LYMPH:           - lymphadenopathy, - masses.  RESP:             + rhonchi,   - rales,  - wheezes.   COR:                S1S2   - gallops,  - rubs.  ABD:                bowel sounds,   soft, - tender, - distended.  EXT/MSC:         - cyanosis,  - clubbing,  - edema.    NEURO:             alert,   responds to stimuli.                          13.1   14.47 )-----------( 389      ( 28 May 2019 14:34 )             45.6           X-ray Chest  (05.29.19 @ 06:59)   The consolidation in the right lower lobe that is present on the CT scan of 5/28/2019 is not well-visualized on this study. It may represent pneumonia or aspiration.    CT Abdomen and Pelvis w/ IV Cont (05.28.19)  There is a large area of consolidation with air bronchograms in the right lower lobe. Smaller consolidation or atelectasis left lower lobe.         ASSESSMENT/PLAN    1)  Pneumonia  2)  Sepsis  3)  Atelectasis  4)  Dementia  5)  Congestive heart failure    Oxygen as needed for a satisfactory SpO2  Bronchodilators:  Atrovent/ albuterol q 4 – 6 hours as needed  ID/Antibiotics: levofloxacin, metronidazole, follow up cultures  Cardiac/HTN: fluid resuscitation as needed  GI: Rx/ prophylaxis c PPI/H2B  Heme: Rx/VT prophylaxis  Discuss with medical team
CC/ HPI Patient is a 83 year old female with hypertension, dementia, congestive heart failure, CVA w/ hemiplegia, cognitive & speech impairments, not alert or communicative at baseline, sent in from [facility] [place] rehab for PEG tube replacement found to have pneumonia, sepsis, seen this morning appears comfortable.        PAST MEDICAL & SURGICAL HISTORY:  Aphasia  Dementia  HTN (hypertension)  HF (heart failure)  GERD (gastroesophageal reflux disease)    SOCHX:  unknown tobacco,  -  alcohol    FMHX: FA/MO  - contributory     ROS reviewed below with positive findings marked (+) :  GEN:  fever, chills ENT: tracheostomy,   epistaxis,  sinusitis COR: CAD, +CHF,  HTN, dysrhythmia PUL: COPD, ILD, asthma, pneumonia GI: PEG, dysphagia, hemorrhage, other MARGARET: kidney disease, electrolyte disorder HEM:  anemia, thrombus, coagulopathy, cancer ENDO:  thyroid disease, diabetes mellitus CNS:  +dementia, stroke, seizure, PSY:  depression, anxiety, other      MEDICATIONS  (STANDING):  ALBUTerol/ipratropium for Nebulization 3 milliLiter(s) Nebulizer every 6 hours  aspirin  chewable 81 milliGRAM(s) Oral daily  heparin  Injectable 5000 Unit(s) SubCutaneous every 8 hours  levoFLOXacin IVPB 750 milliGRAM(s) IV Intermittent every 48 hours  metroNIDAZOLE  IVPB 500 milliGRAM(s) IV Intermittent every 8 hours  sodium chloride 0.9% Bolus 500 milliLiter(s) IV Bolus once    MEDICATIONS  (PRN):      Vital Signs Last 24 Hrs  T(C): 37.2 (30 May 2019 21:23), Max: 38.2 (30 May 2019 18:19)  T(F): 99 (30 May 2019 21:23), Max: 100.7 (30 May 2019 18:19)  HR: 80 (30 May 2019 19:43) (62 - 88)  BP: 94/61 (30 May 2019 21:23) (80/50 - 122/75)  RR: 19 (30 May 2019 21:23) (18 - 19)  SpO2: 98% (30 May 2019 21:23) (96% - 98%)    GENERAL:         comfortable,  - distress.  HEENT:            - trauma,  - icterus,  - injection,  - nasal discharge.  NECK:              - jugular venous distention, - thyromegaly.  LYMPH:           - lymphadenopathy, - masses.  RESP:               + crackles,   - rhonchi,   - wheezes.   COR:                S1S2   - gallops,  - rubs.  ABD:                bowel sounds,   soft, - tender, - distended, - organomegaly.  EXT/MSC:         - cyanosis,  - clubbing,  - edema.    NEURO:             alert,   responds to stimuli.                            8.5    6.97  )-----------( 279      ( 30 May 2019 10:21 )             31.4     05-30    141  |  105  |  30<H>  ----------------------------<  126<H>  3.0<L>   |  27  |  0.79        X-ray Chest  (05.29.19 @ 06:59)   The consolidation in the right lower lobe that is present on the CT scan of 5/28/2019 is not well-visualized on this study. It may represent pneumonia or aspiration.    CT Abdomen and Pelvis w/ IV Cont (05.28.19)  There is a large area of consolidation with air bronchograms in the right lower lobe. Smaller consolidation or atelectasis left lower lobe.         ASSESSMENT/PLAN    1)  Pneumonia  2)  Atelectasis  4)  Dementia  5)  Congestive heart failure    Oxygen as needed for a satisfactory SpO2  Bronchodilators:  Atrovent/ albuterol q 4 – 6 hours as needed  ID/Antibiotics: levofloxacin, metronidazole, follow up cultures  Cardiac/HTN: hemodynamically stable  GI: Rx/ prophylaxis c PPI/H2B  Heme: Rx/VT prophylaxis  Goals of care per HCP comfort   Discuss with medical team
INTERVAL HPI/OVERNIGHT EVENTS:  Patient was seen and examined at bedside. As per nurse no events o/n. Pt awake/inattentive,  not responsive to voice or exam. No ROS possible.     VITAL SIGNS:  T(F): 98.4 (19 @ 16:20)  HR: 98 (19 @ 16:20)  BP: 104/60 (19 @ 16:20)  RR: 20 (19 @ 16:20)  SpO2: 97% (19 @ 08:58)  Wt(kg): --    PHYSICAL EXAM:    Constitutional: NAD, comfortable in bed and non-verbal.   	HEENT: NC/AT, PERRLA, EOMI, no conjunctival pallor or scleral icterus, MMM  	Neck: Supple, no JVD  	Respiratory: Normal rate, rhythm, depth, effort. CTAB. No w/r/r.   	Cardiovascular: RRR, normal S1 and S2, no m/r/g.   	Gastrointestinal: +BS, soft NTND, no guarding or rebound tenderness, no palpable masses. PEG tube in place with erythema surrounding but no pus or fluctuance  	Extremities: wwp; no cyanosis, clubbing or edema.   	Vascular: Pulses equal and strong throughout. B/l lower extremities feel cool to touch   	Neurological: AAOx0, unable to do full neuro exam as patient not responding to questions   Skin: No gross skin abnormalities or rashes    MEDICATIONS  (STANDING):  acetaminophen  IVPB .. 1000 milliGRAM(s) IV Intermittent once  aspirin  chewable 81 milliGRAM(s) Oral daily  dextrose 5%. 1000 milliLiter(s) (100 mL/Hr) IV Continuous <Continuous>  heparin  Injectable 5000 Unit(s) SubCutaneous every 8 hours  levoFLOXacin IVPB 750 milliGRAM(s) IV Intermittent every 48 hours  lisinopril 10 milliGRAM(s) Oral daily  metroNIDAZOLE  IVPB 500 milliGRAM(s) IV Intermittent every 8 hours    MEDICATIONS  (PRN):      Allergies    penicillin (Unknown)    Intolerances        LABS:                        10.9   11.17 )-----------( 360      ( 29 May 2019 11:12 )             39.7         145  |  104  |  41<H>  ----------------------------<  110<H>  SEE NOTE   |  27  |  0.84    Ca    11.6<H>      29 May 2019 07:29  Mg     2.2         TPro  8.4<H>  /  Alb  3.4  /  TBili  0.7  /  DBili  x   /  AST  15  /  ALT  18  /  AlkPhos  172<H>      PT/INR - ( 28 May 2019 14:34 )   PT: 14.2 sec;   INR: 1.25          PTT - ( 28 May 2019 14:34 )  PTT:27.9 sec  Urinalysis Basic - ( 28 May 2019 16:55 )    Color: Yellow / Appearance: Clear / S.025 / pH: x  Gluc: x / Ketone: NEGATIVE  / Bili: Negative / Urobili: 0.2 E.U./dL   Blood: x / Protein: 30 mg/dL / Nitrite: NEGATIVE   Leuk Esterase: NEGATIVE / RBC: > 10 /HPF / WBC < 5 /HPF   Sq Epi: x / Non Sq Epi: 0-5 /HPF / Bacteria: Present /HPF        RADIOLOGY & ADDITIONAL TESTS:  Reviewed
PEG site clean.   abdomen: softer  may start feeds if residuals acceptable  routine use and care for PEG
Pt seen and examined   feedings in progress    REVIEW OF SYSTEMS:  unable      MEDICATIONS:  MEDICATIONS  (STANDING):  ALBUTerol/ipratropium for Nebulization 3 milliLiter(s) Nebulizer every 6 hours  aspirin  chewable 81 milliGRAM(s) Oral daily  heparin  Injectable 5000 Unit(s) SubCutaneous every 8 hours  levoFLOXacin IVPB 750 milliGRAM(s) IV Intermittent every 48 hours  metroNIDAZOLE  IVPB 500 milliGRAM(s) IV Intermittent every 8 hours  sodium chloride 0.9% Bolus 500 milliLiter(s) IV Bolus once    MEDICATIONS  (PRN):      Allergies    penicillin (Swelling; Angioedema (Unknown); Hives)    Intolerances        Vital Signs Last 24 Hrs  T(C): 37.4 (31 May 2019 09:19), Max: 38.2 (30 May 2019 18:19)  T(F): 99.3 (31 May 2019 09:19), Max: 100.7 (30 May 2019 18:19)  HR: 77 (31 May 2019 09:19) (77 - 91)  BP: 103/55 (31 May 2019 09:19) (80/50 - 107/67)  BP(mean): --  RR: 18 (31 May 2019 09:19) (18 - 19)  SpO2: 97% (31 May 2019 09:19) (95% - 98%)      PHYSICAL EXAM:    General: ; in no acute distress  HEENT: MMM, conjunctiva and sclera clear  Gastrointestinal: Soft non-tender nobese Normal bowel sounds; peg site with minimal amount of discharge  Skin: Warm and dry. No obvious rash    LABS:      CBC Full  -  ( 30 May 2019 10:21 )  WBC Count : 6.97 K/uL  RBC Count : 4.12 M/uL  Hemoglobin : 8.5 g/dL  Hematocrit : 31.4 %  Platelet Count - Automated : 279 K/uL  Mean Cell Volume : 76.2 fl  Mean Cell Hemoglobin : 20.6 pg  Mean Cell Hemoglobin Concentration : 27.1 gm/dL  Auto Neutrophil # : x  Auto Lymphocyte # : x  Auto Monocyte # : x  Auto Eosinophil # : x  Auto Basophil # : x  Auto Neutrophil % : x  Auto Lymphocyte % : x  Auto Monocyte % : x  Auto Eosinophil % : x  Auto Basophil % : x    05-30    141  |  105  |  30<H>  ----------------------------<  126<H>  3.0<L>   |  27  |  0.79    Ca    10.6<H>      30 May 2019 10:22  Phos  1.8     05-30  Mg     2.0     05-30                        RADIOLOGY & ADDITIONAL STUDIES (The following images were personally reviewed):
SUBJECTIVE / INTERVAL HPI: Patient seen and examined at bedside.     VITAL SIGNS:  Vital Signs Last 24 Hrs  T(C): 37.2 (31 May 2019 05:09), Max: 38.2 (30 May 2019 18:19)  T(F): 99 (31 May 2019 05:09), Max: 100.7 (30 May 2019 18:19)  HR: 91 (31 May 2019 05:09) (78 - 91)  BP: 107/67 (31 May 2019 05:09) (80/50 - 122/75)  BP(mean): --  RR: 19 (31 May 2019 05:09) (18 - 19)  SpO2: 95% (31 May 2019 05:09) (95% - 98%)    REVIEW OF SYSTEMS:    CONSTITUTIONAL: No weakness, fevers or chills.   EYES/ENT: No visual changes;  No vertigo or throat pain   NECK: No pain or stiffness  RESPIRATORY: No cough, wheezing, hemoptysis; No shortness of breath  CARDIOVASCULAR: No chest pain or palpitations  GASTROINTESTINAL: No abdominal or epigastric pain. No nausea, vomiting, or hematemesis; No diarrhea or constipation. No melena or hematochezia.  GENITOURINARY: No dysuria, frequency or hematuria  NEUROLOGICAL: No numbness or weakness  SKIN: No itching, burning, rashes, or lesions   All other review of systems is negative unless indicated above.    PHYSICAL EXAM:  General: WDWN  HEENT: NC/AT; PERRL, clear conjunctiva  Neck: supple  Cardiovascular: +S1/S2; RRR  Respiratory: CTA b/l; no W/R/R  Gastrointestinal: soft, NT/ND; +BSx4  Extremities: WWP; 2+ peripheral pulses; no edema   Neurological: AAOx3; no focal deficits    MEDICATIONS:  MEDICATIONS  (STANDING):  ALBUTerol/ipratropium for Nebulization 3 milliLiter(s) Nebulizer every 6 hours  aspirin  chewable 81 milliGRAM(s) Oral daily  heparin  Injectable 5000 Unit(s) SubCutaneous every 8 hours  levoFLOXacin IVPB 750 milliGRAM(s) IV Intermittent every 48 hours  metroNIDAZOLE  IVPB 500 milliGRAM(s) IV Intermittent every 8 hours  sodium chloride 0.9% Bolus 500 milliLiter(s) IV Bolus once    MEDICATIONS  (PRN):      ALLERGIES:  Allergies    penicillin (Swelling; Angioedema (Unknown); Hives)    Intolerances        LABS:                        8.5    6.97  )-----------( 279      ( 30 May 2019 10:21 )             31.4     05-30    141  |  105  |  30<H>  ----------------------------<  126<H>  3.0<L>   |  27  |  0.79    Ca    10.6<H>      30 May 2019 10:22  Phos  1.8     05-30  Mg     2.0     05-30          CAPILLARY BLOOD GLUCOSE      POCT Blood Glucose.: 111 mg/dL (30 May 2019 11:43)      RADIOLOGY & ADDITIONAL TESTS: Reviewed.
SUBJECTIVE / INTERVAL HPI: Lethargic    VITAL SIGNS:  Vital Signs Last 24 Hrs  T(C): 37.2 (30 May 2019 09:40), Max: 37.2 (30 May 2019 09:40)  T(F): 98.9 (30 May 2019 09:40), Max: 98.9 (30 May 2019 09:40)  HR: 78 (30 May 2019 09:40) (62 - 98)  BP: 122/75 (30 May 2019 09:40) (102/68 - 122/75)  BP(mean): --  RR: 18 (30 May 2019 09:40) (16 - 20)  SpO2: 96% (30 May 2019 09:40) (96% - 97%)    REVIEW OF SYSTEMS:  Subjective history limited in unresponsive patient.     PHYSICAL EXAM:  Constitutional: NAD, comfortable in bed and non-verbal.   HEENT: NC/AT, PERRLA, EOMI, no conjunctival pallor or scleral icterus, MMM  Neck: Supple, no JVD  Respiratory: Normal rate, rhythm, depth, effort. CTAB. No w/r/r.   Cardiovascular: RRR, normal S1 and S2, no m/r/g.   Gastrointestinal: +BS, soft NTND, no guarding or rebound tenderness, no palpable masses. PEG tube in place, clean dry and intact  Extremities: wwp; no cyanosis, clubbing or edema.   Vascular: Pulses equal and strong throughout. B/l lower extremities feel cool to touch   Neurological: AAOx0, unable to do full neuro exam as patient not responding to questions   Skin: No gross skin abnormalities or rashes    MEDICATIONS:  MEDICATIONS  (STANDING):  acetaminophen  IVPB .. 1000 milliGRAM(s) IV Intermittent once  aspirin  chewable 81 milliGRAM(s) Oral daily  heparin  Injectable 5000 Unit(s) SubCutaneous every 8 hours  levoFLOXacin IVPB 750 milliGRAM(s) IV Intermittent every 48 hours  lisinopril 10 milliGRAM(s) Oral daily  metroNIDAZOLE  IVPB 500 milliGRAM(s) IV Intermittent every 8 hours    MEDICATIONS  (PRN):      ALLERGIES:  Allergies    penicillin (Swelling; Angioedema (Unknown); Hives)    Intolerances        LABS:                        8.5    6.97  )-----------( 279      ( 30 May 2019 10:21 )             31.4     05-30    141  |  105  |  30<H>  ----------------------------<  126<H>  3.0<L>   |  27  |  0.79    Ca    10.6<H>      30 May 2019 10:22  Phos  1.8     -  Mg     2.0         TPro  8.4<H>  /  Alb  3.4  /  TBili  0.7  /  DBili  x   /  AST  15  /  ALT  18  /  AlkPhos  172<H>        Urinalysis Basic - ( 28 May 2019 16:55 )    Color: Yellow / Appearance: Clear / S.025 / pH: x  Gluc: x / Ketone: NEGATIVE  / Bili: Negative / Urobili: 0.2 E.U./dL   Blood: x / Protein: 30 mg/dL / Nitrite: NEGATIVE   Leuk Esterase: NEGATIVE / RBC: > 10 /HPF / WBC < 5 /HPF   Sq Epi: x / Non Sq Epi: 0-5 /HPF / Bacteria: Present /HPF      CAPILLARY BLOOD GLUCOSE      POCT Blood Glucose.: 111 mg/dL (30 May 2019 11:43)      RADIOLOGY & ADDITIONAL TESTS: Reviewed.
SUBJECTIVE / INTERVAL HPI: Patient seen and examined at bedside. No events overnight. Subjective history limited in unresponsive patient.     VITAL SIGNS:  Vital Signs Last 24 Hrs  T(C): 37.2 (30 May 2019 09:40), Max: 37.2 (30 May 2019 09:40)  T(F): 98.9 (30 May 2019 09:40), Max: 98.9 (30 May 2019 09:40)  HR: 78 (30 May 2019 09:40) (62 - 98)  BP: 122/75 (30 May 2019 09:40) (102/68 - 122/75)  BP(mean): --  RR: 18 (30 May 2019 09:40) (16 - 20)  SpO2: 96% (30 May 2019 09:40) (96% - 97%)    REVIEW OF SYSTEMS:  Subjective history limited in unresponsive patient.     PHYSICAL EXAM:  Constitutional: NAD, comfortable in bed and non-verbal.   HEENT: NC/AT, PERRLA, EOMI, no conjunctival pallor or scleral icterus, MMM  Neck: Supple, no JVD  Respiratory: Normal rate, rhythm, depth, effort. CTAB. No w/r/r.   Cardiovascular: RRR, normal S1 and S2, no m/r/g.   Gastrointestinal: +BS, soft NTND, no guarding or rebound tenderness, no palpable masses. PEG tube in place, clean dry and intact  Extremities: wwp; no cyanosis, clubbing or edema.   Vascular: Pulses equal and strong throughout. B/l lower extremities feel cool to touch   Neurological: AAOx0, unable to do full neuro exam as patient not responding to questions   Skin: No gross skin abnormalities or rashes    MEDICATIONS:  MEDICATIONS  (STANDING):  acetaminophen  IVPB .. 1000 milliGRAM(s) IV Intermittent once  aspirin  chewable 81 milliGRAM(s) Oral daily  heparin  Injectable 5000 Unit(s) SubCutaneous every 8 hours  levoFLOXacin IVPB 750 milliGRAM(s) IV Intermittent every 48 hours  lisinopril 10 milliGRAM(s) Oral daily  metroNIDAZOLE  IVPB 500 milliGRAM(s) IV Intermittent every 8 hours    MEDICATIONS  (PRN):      ALLERGIES:  Allergies    penicillin (Swelling; Angioedema (Unknown); Hives)    Intolerances        LABS:                        8.5    6.97  )-----------( 279      ( 30 May 2019 10:21 )             31.4     05-30    141  |  105  |  30<H>  ----------------------------<  126<H>  3.0<L>   |  27  |  0.79    Ca    10.6<H>      30 May 2019 10:22  Phos  1.8       Mg     2.0         TPro  8.4<H>  /  Alb  3.4  /  TBili  0.7  /  DBili  x   /  AST  15  /  ALT  18  /  AlkPhos  172<H>        Urinalysis Basic - ( 28 May 2019 16:55 )    Color: Yellow / Appearance: Clear / S.025 / pH: x  Gluc: x / Ketone: NEGATIVE  / Bili: Negative / Urobili: 0.2 E.U./dL   Blood: x / Protein: 30 mg/dL / Nitrite: NEGATIVE   Leuk Esterase: NEGATIVE / RBC: > 10 /HPF / WBC < 5 /HPF   Sq Epi: x / Non Sq Epi: 0-5 /HPF / Bacteria: Present /HPF      CAPILLARY BLOOD GLUCOSE      POCT Blood Glucose.: 111 mg/dL (30 May 2019 11:43)      RADIOLOGY & ADDITIONAL TESTS: Reviewed.

## 2019-05-31 NOTE — DISCHARGE NOTE NURSING/CASE MANAGEMENT/SOCIAL WORK - [email or web address]_GEN_ALL_CORE
You can access the [facility] Portal, offered by [facility], by registering with the following website: [email or web address]

## 2019-05-31 NOTE — DISCHARGE NOTE PROVIDER - CARE PROVIDER_API CALL
Addy Griffin)  Internal Medicine  [address] [address]  Phone: [phone]  Fax: [phone]  Follow Up Time:

## 2019-05-31 NOTE — DISCHARGE NOTE PROVIDER - HOSPITAL COURSE
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 She had a temporary lazo placed in the site on May 25 in the ED and was sent back from rehab to complete the PEG replacement today. Patient non-verbal at baseline and unable to get full ROS.     As per the daughter she has been having extra secretions for the past 2-3 days. She has phlegm and is always congested as per the daughter. Her PEG was placed almost 10 years ago at the same time patient has a stroke. She is also bed bound.  Patient has pulled out her PEG tube multiple times in the past. On arrival patient was found to have fever and findings suggestive of aspiration pneumonia on chest x-ray, for which she was started on levofloxacin and flagyl given PCN allergy. Patient had improvement in sputum production. She was seen by Dr. Valdovinos who replaced the PEG tube and feeds were restarted. On discussion with the daughter, plan is for full comfort care given patient has minimal quality of life, and she would like to be DNR/DNI with no MEWS.        On the day of discharge, the patient was seen and examined. Symptoms improved. Vital signs are stable. Labs and imaging reviewed. Patient is medically optimized and hemodynamically stable. Return precautions discussed, medication teachback done with family, and importance of physician followup emphasized. 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 She had a temporary lazo placed in the site on May 25 in the ED and was sent back from rehab to complete the PEG replacement today. Patient non-verbal at baseline and unable to get full ROS.     As per the daughter she has been having extra secretions for the past 2-3 days. She has phlegm and is always congested as per the daughter. Her PEG was placed almost 10 years ago at the same time patient has a stroke. She is also bed bound.  Patient has pulled out her PEG tube multiple times in the past. On arrival patient was found to have fever and findings suggestive of aspiration pneumonia on chest x-ray, for which she was started on levofloxacin and flagyl given PCN allergy. Patient had improvement in sputum production. She was seen by Dr. Valdovinos who replaced the PEG tube and feeds were restarted. On discussion with the daughter, plan is for full comfort care given patient has minimal quality of life, and she would like to be DNR/DNI with no MEWS. Family would prefer to pursue palliative care through [facility] and a referral was put through.         On the day of discharge, the patient was seen and examined. Symptoms improved. Vital signs are stable. Labs and imaging reviewed. Patient is medically optimized and hemodynamically stable. Return precautions discussed, medication teachback done with family, and importance of physician followup emphasized. The family verbalized understanding (nonverbal patient)

## 2019-05-31 NOTE — PROGRESS NOTE ADULT - PROVIDER SPECIALTY LIST ADULT
Gastroenterology
Gastroenterology
Internal Medicine
Pulmonology
Internal Medicine

## 2019-05-31 NOTE — PROGRESS NOTE ADULT - REASON FOR ADMISSION
PEG tube replacement

## 2019-06-02 LAB
CULTURE RESULTS: SIGNIFICANT CHANGE UP
CULTURE RESULTS: SIGNIFICANT CHANGE UP
SPECIMEN SOURCE: SIGNIFICANT CHANGE UP
SPECIMEN SOURCE: SIGNIFICANT CHANGE UP

## 2019-06-06 DIAGNOSIS — Z66 DO NOT RESUSCITATE: ICD-10-CM

## 2019-06-06 DIAGNOSIS — K94.23 GASTROSTOMY MALFUNCTION: ICD-10-CM

## 2019-06-06 DIAGNOSIS — I11.0 HYPERTENSIVE HEART DISEASE WITH HEART FAILURE: ICD-10-CM

## 2019-06-06 DIAGNOSIS — I50.9 HEART FAILURE, UNSPECIFIED: ICD-10-CM

## 2019-06-06 DIAGNOSIS — I69.959 HEMIPLEGIA AND HEMIPARESIS FOLLOWING UNSPECIFIED CEREBROVASCULAR DISEASE AFFECTING UNSPECIFIED SIDE: ICD-10-CM

## 2019-06-06 DIAGNOSIS — J98.11 ATELECTASIS: ICD-10-CM

## 2019-06-06 DIAGNOSIS — Z51.5 ENCOUNTER FOR PALLIATIVE CARE: ICD-10-CM

## 2019-06-06 DIAGNOSIS — E87.0 HYPEROSMOLALITY AND HYPERNATREMIA: ICD-10-CM

## 2019-06-06 DIAGNOSIS — K31.1 ADULT HYPERTROPHIC PYLORIC STENOSIS: ICD-10-CM

## 2019-06-06 DIAGNOSIS — F03.90 UNSPECIFIED DEMENTIA WITHOUT BEHAVIORAL DISTURBANCE: ICD-10-CM

## 2019-06-06 DIAGNOSIS — Z88.0 ALLERGY STATUS TO PENICILLIN: ICD-10-CM

## 2019-06-06 DIAGNOSIS — A41.9 SEPSIS, UNSPECIFIED ORGANISM: ICD-10-CM

## 2019-06-06 DIAGNOSIS — L03.311 CELLULITIS OF ABDOMINAL WALL: ICD-10-CM

## 2019-06-06 DIAGNOSIS — J69.0 PNEUMONITIS DUE TO INHALATION OF FOOD AND VOMIT: ICD-10-CM

## 2019-06-06 DIAGNOSIS — I69.920 APHASIA FOLLOWING UNSPECIFIED CEREBROVASCULAR DISEASE: ICD-10-CM

## 2023-02-27 NOTE — ED PROCEDURE NOTE - CPROC ED INFORMED CONSENT1
----- Message from Helen Avila PA-C sent at 2/27/2023  1:55 PM EST -----  Please let them know that their CAM monitor was overall unremarkable. We will discuss at their follow up appointment.
Results given to pt
This was an emergent procedure and consent was implied.

## 2024-03-04 NOTE — ED PROVIDER NOTE - UNABLE TO OBTAIN
Referral entered for orthotics  Will fax referral/prescription once approved  Kamron Dodge  [address] [place] [id number] [phone]     Start Ketoconazole- sent to pharmacy  
Dementia

## 2024-09-12 NOTE — ED ADULT NURSE NOTE - DOES PATIENT HAVE ADVANCE DIRECTIVE
RN spoke with pt in regards to results. Pt will continue to monitor symptoms. Pt states that she thinks she may have covid, but tested negative at home. Denies any current fever but has a headache.    Agreed to come in for evaluation in a day or two if symptoms don't seem to be improving.       Jeannine MCCLELLAN RN     
Yes